# Patient Record
Sex: MALE | Race: WHITE | NOT HISPANIC OR LATINO | Employment: OTHER | URBAN - METROPOLITAN AREA
[De-identification: names, ages, dates, MRNs, and addresses within clinical notes are randomized per-mention and may not be internally consistent; named-entity substitution may affect disease eponyms.]

---

## 2020-10-22 ENCOUNTER — PRE-ADMISSION TESTING (OUTPATIENT)
Dept: ADMISSIONS | Facility: MEDICAL CENTER | Age: 70
End: 2020-10-22
Attending: ORTHOPAEDIC SURGERY
Payer: COMMERCIAL

## 2020-10-22 DIAGNOSIS — Z01.810 PRE-OPERATIVE CARDIOVASCULAR EXAMINATION: ICD-10-CM

## 2020-10-22 DIAGNOSIS — Z01.812 PRE-OPERATIVE LABORATORY EXAMINATION: ICD-10-CM

## 2020-10-22 LAB
ANION GAP SERPL CALC-SCNC: 11 MMOL/L (ref 7–16)
APTT PPP: 30.8 SEC (ref 24.7–36)
BUN SERPL-MCNC: 19 MG/DL (ref 8–22)
CALCIUM SERPL-MCNC: 9.3 MG/DL (ref 8.4–10.2)
CHLORIDE SERPL-SCNC: 107 MMOL/L (ref 96–112)
CO2 SERPL-SCNC: 27 MMOL/L (ref 20–33)
CREAT SERPL-MCNC: 1.29 MG/DL (ref 0.5–1.4)
EKG IMPRESSION: NORMAL
ERYTHROCYTE [DISTWIDTH] IN BLOOD BY AUTOMATED COUNT: 48.6 FL (ref 35.9–50)
EST. AVERAGE GLUCOSE BLD GHB EST-MCNC: 105 MG/DL
GLUCOSE SERPL-MCNC: 102 MG/DL (ref 65–99)
HBA1C MFR BLD: 5.3 % (ref 0–5.6)
HCT VFR BLD AUTO: 47.5 % (ref 42–52)
HGB BLD-MCNC: 16.1 G/DL (ref 14–18)
HIV 1+2 AB+HIV1 P24 AG SERPL QL IA: NORMAL
INR PPP: 0.96 (ref 0.87–1.13)
MCH RBC QN AUTO: 33.3 PG (ref 27–33)
MCHC RBC AUTO-ENTMCNC: 33.9 G/DL (ref 33.7–35.3)
MCV RBC AUTO: 98.1 FL (ref 81.4–97.8)
PLATELET # BLD AUTO: 244 K/UL (ref 164–446)
PMV BLD AUTO: 10.6 FL (ref 9–12.9)
POTASSIUM SERPL-SCNC: 4 MMOL/L (ref 3.6–5.5)
PROTHROMBIN TIME: 12.5 SEC (ref 12–14.6)
RBC # BLD AUTO: 4.84 M/UL (ref 4.7–6.1)
SCCMEC + MECA PNL NOSE NAA+PROBE: NEGATIVE
SCCMEC + MECA PNL NOSE NAA+PROBE: NEGATIVE
SODIUM SERPL-SCNC: 145 MMOL/L (ref 135–145)
WBC # BLD AUTO: 7.7 K/UL (ref 4.8–10.8)

## 2020-10-22 PROCEDURE — 80048 BASIC METABOLIC PNL TOTAL CA: CPT

## 2020-10-22 PROCEDURE — 87389 HIV-1 AG W/HIV-1&-2 AB AG IA: CPT

## 2020-10-22 PROCEDURE — 36415 COLL VENOUS BLD VENIPUNCTURE: CPT

## 2020-10-22 PROCEDURE — 85027 COMPLETE CBC AUTOMATED: CPT

## 2020-10-22 PROCEDURE — 85730 THROMBOPLASTIN TIME PARTIAL: CPT

## 2020-10-22 PROCEDURE — 87640 STAPH A DNA AMP PROBE: CPT

## 2020-10-22 PROCEDURE — 93005 ELECTROCARDIOGRAM TRACING: CPT

## 2020-10-22 PROCEDURE — 83036 HEMOGLOBIN GLYCOSYLATED A1C: CPT

## 2020-10-22 PROCEDURE — 93010 ELECTROCARDIOGRAM REPORT: CPT | Performed by: INTERNAL MEDICINE

## 2020-10-22 PROCEDURE — 87641 MR-STAPH DNA AMP PROBE: CPT

## 2020-10-22 PROCEDURE — 85610 PROTHROMBIN TIME: CPT

## 2020-10-22 RX ORDER — COLCHICINE 0.6 MG/1
0.5 TABLET, FILM COATED ORAL PRN
COMMUNITY
Start: 2020-08-13

## 2020-10-22 RX ORDER — IBUPROFEN 200 MG
200 TABLET ORAL EVERY 6 HOURS PRN
Status: ON HOLD | COMMUNITY
End: 2020-11-04

## 2020-10-22 SDOH — HEALTH STABILITY: MENTAL HEALTH: HOW MANY STANDARD DRINKS CONTAINING ALCOHOL DO YOU HAVE ON A TYPICAL DAY?: 1 OR 2

## 2020-10-22 SDOH — HEALTH STABILITY: MENTAL HEALTH: HOW OFTEN DO YOU HAVE A DRINK CONTAINING ALCOHOL?: 4 OR MORE TIMES A WEEK

## 2020-10-22 NOTE — DISCHARGE PLANNING
DISCHARGE PLANNING NOTE - TOTAL JOINT     Procedure: Procedure(s):  ARTHROPLASTY, KNEE, TOTAL  Procedure Date: 11/4/2020  Insurance:  Payor: AETNA / Plan: AETNA / Product Type: Multiple /   Equipment currently available at home? nothing.  Steps into the home? 2  Steps within the home? 0  Toilet height? Standard  Type of shower? walk-in shower  Who will be with you during your recovery? Spouse.  Is Outpatient Physical Therapy set up after surgery? Yes  Did you take the Total Joint Class and where? Yes, at online last week.   Planning on same day discharge? yes     This writer met with pt during his preadmission appointment. Equipment resource list reviewed and pt states he needs a walker. Choice obtained and scanned in to [email or web address]. Home safety checklist reviewed and copy given to pt. All questions answered and pt verbalizes understanding of all instructions. No further dc needs identified at this time. Anticipate dc to home without barriers.

## 2020-10-22 NOTE — OR NURSING
"Pre-admit appointment completed. \"Preparing for your procedure\" sheet given to pt along with verbal and written instructions. Pt instructed to continue regularly prescribed medications through the day before surgery.     Pt denies any problems with anesthesia.    Pt to get COVID test 10/29/20.           Pt given instructions to self isolate after COVID test and to contact doctor if any symptoms of COVID occur.  "

## 2020-10-29 ENCOUNTER — PRE-ADMISSION TESTING (OUTPATIENT)
Dept: ADMISSIONS | Facility: MEDICAL CENTER | Age: 70
End: 2020-10-29
Attending: ORTHOPAEDIC SURGERY
Payer: COMMERCIAL

## 2020-10-29 DIAGNOSIS — Z01.812 PRE-OPERATIVE LABORATORY EXAMINATION: ICD-10-CM

## 2020-10-29 LAB
COVID ORDER STATUS COVID19: NORMAL
SARS-COV-2 RNA RESP QL NAA+PROBE: NOTDETECTED
SPECIMEN SOURCE: NORMAL

## 2020-10-29 PROCEDURE — C9803 HOPD COVID-19 SPEC COLLECT: HCPCS

## 2020-11-04 ENCOUNTER — APPOINTMENT (OUTPATIENT)
Dept: RADIOLOGY | Facility: MEDICAL CENTER | Age: 70
End: 2020-11-04
Attending: ORTHOPAEDIC SURGERY
Payer: COMMERCIAL

## 2020-11-04 ENCOUNTER — HOSPITAL ENCOUNTER (OUTPATIENT)
Facility: MEDICAL CENTER | Age: 70
End: 2020-11-05
Attending: ORTHOPAEDIC SURGERY | Admitting: ORTHOPAEDIC SURGERY
Payer: COMMERCIAL

## 2020-11-04 ENCOUNTER — ANESTHESIA EVENT (OUTPATIENT)
Dept: SURGERY | Facility: MEDICAL CENTER | Age: 70
End: 2020-11-04
Payer: COMMERCIAL

## 2020-11-04 ENCOUNTER — ANESTHESIA (OUTPATIENT)
Dept: SURGERY | Facility: MEDICAL CENTER | Age: 70
End: 2020-11-04
Payer: COMMERCIAL

## 2020-11-04 DIAGNOSIS — M19.90 ARTHRITIS: ICD-10-CM

## 2020-11-04 DIAGNOSIS — M25.562 BILATERAL CHRONIC KNEE PAIN: ICD-10-CM

## 2020-11-04 DIAGNOSIS — M17.11 PRIMARY OSTEOARTHRITIS OF RIGHT KNEE: ICD-10-CM

## 2020-11-04 DIAGNOSIS — M25.561 BILATERAL CHRONIC KNEE PAIN: ICD-10-CM

## 2020-11-04 DIAGNOSIS — G89.29 BILATERAL CHRONIC KNEE PAIN: ICD-10-CM

## 2020-11-04 PROBLEM — M10.9 GOUT: Status: ACTIVE | Noted: 2020-11-04

## 2020-11-04 PROBLEM — R12 HEART BURN: Status: ACTIVE | Noted: 2020-11-04

## 2020-11-04 LAB — PATHOLOGY CONSULT NOTE: NORMAL

## 2020-11-04 PROCEDURE — 503339 HCHG DRESSSING PICO: Performed by: ORTHOPAEDIC SURGERY

## 2020-11-04 PROCEDURE — G0378 HOSPITAL OBSERVATION PER HR: HCPCS

## 2020-11-04 PROCEDURE — 88161 CYTOPATH SMEAR OTHER SOURCE: CPT

## 2020-11-04 PROCEDURE — 96375 TX/PRO/DX INJ NEW DRUG ADDON: CPT

## 2020-11-04 PROCEDURE — 700101 HCHG RX REV CODE 250: Performed by: ORTHOPAEDIC SURGERY

## 2020-11-04 PROCEDURE — 302135 SEQUENTIAL COMPRESSION MACHINE: Performed by: ORTHOPAEDIC SURGERY

## 2020-11-04 PROCEDURE — 700102 HCHG RX REV CODE 250 W/ 637 OVERRIDE(OP): Performed by: ANESTHESIOLOGY

## 2020-11-04 PROCEDURE — 700105 HCHG RX REV CODE 258: Performed by: ANESTHESIOLOGY

## 2020-11-04 PROCEDURE — 160048 HCHG OR STATISTICAL LEVEL 1-5: Performed by: ORTHOPAEDIC SURGERY

## 2020-11-04 PROCEDURE — 700101 HCHG RX REV CODE 250: Performed by: ANESTHESIOLOGY

## 2020-11-04 PROCEDURE — 64447 NJX AA&/STRD FEMORAL NRV IMG: CPT | Performed by: ORTHOPAEDIC SURGERY

## 2020-11-04 PROCEDURE — 160009 HCHG ANES TIME/MIN: Performed by: ORTHOPAEDIC SURGERY

## 2020-11-04 PROCEDURE — 700111 HCHG RX REV CODE 636 W/ 250 OVERRIDE (IP): Performed by: ANESTHESIOLOGY

## 2020-11-04 PROCEDURE — 501838 HCHG SUTURE GENERAL: Performed by: ORTHOPAEDIC SURGERY

## 2020-11-04 PROCEDURE — A9270 NON-COVERED ITEM OR SERVICE: HCPCS | Performed by: ORTHOPAEDIC SURGERY

## 2020-11-04 PROCEDURE — 96365 THER/PROPH/DIAG IV INF INIT: CPT

## 2020-11-04 PROCEDURE — C1776 JOINT DEVICE (IMPLANTABLE): HCPCS | Performed by: ORTHOPAEDIC SURGERY

## 2020-11-04 PROCEDURE — 502579 HCHG PACK, TOTAL KNEE: Performed by: ORTHOPAEDIC SURGERY

## 2020-11-04 PROCEDURE — 160041 HCHG SURGERY MINUTES - EA ADDL 1 MIN LEVEL 4: Performed by: ORTHOPAEDIC SURGERY

## 2020-11-04 PROCEDURE — 160035 HCHG PACU - 1ST 60 MINS PHASE I: Performed by: ORTHOPAEDIC SURGERY

## 2020-11-04 PROCEDURE — 700105 HCHG RX REV CODE 258: Performed by: PHYSICIAN ASSISTANT

## 2020-11-04 PROCEDURE — 160036 HCHG PACU - EA ADDL 30 MINS PHASE I: Performed by: ORTHOPAEDIC SURGERY

## 2020-11-04 PROCEDURE — 88304 TISSUE EXAM BY PATHOLOGIST: CPT

## 2020-11-04 PROCEDURE — 700101 HCHG RX REV CODE 250: Performed by: PHYSICIAN ASSISTANT

## 2020-11-04 PROCEDURE — 73560 X-RAY EXAM OF KNEE 1 OR 2: CPT | Mod: RT

## 2020-11-04 PROCEDURE — A9270 NON-COVERED ITEM OR SERVICE: HCPCS | Performed by: PHYSICIAN ASSISTANT

## 2020-11-04 PROCEDURE — L8699 PROSTHETIC IMPLANT NOS: HCPCS | Performed by: ORTHOPAEDIC SURGERY

## 2020-11-04 PROCEDURE — 700102 HCHG RX REV CODE 250 W/ 637 OVERRIDE(OP): Performed by: ORTHOPAEDIC SURGERY

## 2020-11-04 PROCEDURE — 88160 CYTOPATH SMEAR OTHER SOURCE: CPT

## 2020-11-04 PROCEDURE — 160029 HCHG SURGERY MINUTES - 1ST 30 MINS LEVEL 4: Performed by: ORTHOPAEDIC SURGERY

## 2020-11-04 PROCEDURE — 700111 HCHG RX REV CODE 636 W/ 250 OVERRIDE (IP): Performed by: ORTHOPAEDIC SURGERY

## 2020-11-04 PROCEDURE — 700111 HCHG RX REV CODE 636 W/ 250 OVERRIDE (IP): Performed by: PHYSICIAN ASSISTANT

## 2020-11-04 PROCEDURE — 502240 HCHG MISC OR SUPPLY RC 0272: Performed by: ORTHOPAEDIC SURGERY

## 2020-11-04 PROCEDURE — 700102 HCHG RX REV CODE 250 W/ 637 OVERRIDE(OP): Performed by: PHYSICIAN ASSISTANT

## 2020-11-04 PROCEDURE — A9270 NON-COVERED ITEM OR SERVICE: HCPCS | Performed by: ANESTHESIOLOGY

## 2020-11-04 PROCEDURE — 160002 HCHG RECOVERY MINUTES (STAT): Performed by: ORTHOPAEDIC SURGERY

## 2020-11-04 PROCEDURE — 700105 HCHG RX REV CODE 258: Performed by: ORTHOPAEDIC SURGERY

## 2020-11-04 DEVICE — INSERT BCS XLPE ARIGHT JOUNEY II  SIZE 5-6 RIGHT 9MM: Type: IMPLANTABLE DEVICE | Site: KNEE | Status: FUNCTIONAL

## 2020-11-04 DEVICE — FEMUR BCS OXINIUM JOURNEY II RIGHT SIZE 6 (1EA): Type: IMPLANTABLE DEVICE | Site: KNEE | Status: FUNCTIONAL

## 2020-11-04 DEVICE — BASE TIBIAL NONPOROUS JOURNEY II RIGHT SIZE 5 (1EA): Type: IMPLANTABLE DEVICE | Site: KNEE | Status: FUNCTIONAL

## 2020-11-04 DEVICE — IMPLANT GII OVAL RESURFACING PAT 32MM (1EA): Type: IMPLANTABLE DEVICE | Site: KNEE | Status: FUNCTIONAL

## 2020-11-04 DEVICE — CEMENT ORTHOPEDIC HV US  (10/PK): Type: IMPLANTABLE DEVICE | Site: KNEE | Status: FUNCTIONAL

## 2020-11-04 RX ORDER — OXYCODONE HYDROCHLORIDE 5 MG/1
5 TABLET ORAL
Status: DISCONTINUED | OUTPATIENT
Start: 2020-11-04 | End: 2020-11-05 | Stop reason: HOSPADM

## 2020-11-04 RX ORDER — HYDROMORPHONE HYDROCHLORIDE 1 MG/ML
0.5 INJECTION, SOLUTION INTRAMUSCULAR; INTRAVENOUS; SUBCUTANEOUS
Status: DISCONTINUED | OUTPATIENT
Start: 2020-11-04 | End: 2020-11-05 | Stop reason: HOSPADM

## 2020-11-04 RX ORDER — AMOXICILLIN 250 MG
1 CAPSULE ORAL
Status: DISCONTINUED | OUTPATIENT
Start: 2020-11-04 | End: 2020-11-05 | Stop reason: HOSPADM

## 2020-11-04 RX ORDER — OXYCODONE HYDROCHLORIDE 10 MG/1
10 TABLET ORAL
Status: DISCONTINUED | OUTPATIENT
Start: 2020-11-04 | End: 2020-11-05 | Stop reason: HOSPADM

## 2020-11-04 RX ORDER — KETOROLAC TROMETHAMINE 30 MG/ML
15 INJECTION, SOLUTION INTRAMUSCULAR; INTRAVENOUS EVERY 6 HOURS
Status: DISCONTINUED | OUTPATIENT
Start: 2020-11-04 | End: 2020-11-05 | Stop reason: HOSPADM

## 2020-11-04 RX ORDER — ONDANSETRON 2 MG/ML
4 INJECTION INTRAMUSCULAR; INTRAVENOUS
Status: DISCONTINUED | OUTPATIENT
Start: 2020-11-04 | End: 2020-11-04 | Stop reason: HOSPADM

## 2020-11-04 RX ORDER — LABETALOL HYDROCHLORIDE 5 MG/ML
5 INJECTION, SOLUTION INTRAVENOUS
Status: DISCONTINUED | OUTPATIENT
Start: 2020-11-04 | End: 2020-11-04 | Stop reason: HOSPADM

## 2020-11-04 RX ORDER — ACETAMINOPHEN 500 MG
500 TABLET ORAL EVERY 6 HOURS
Status: DISCONTINUED | OUTPATIENT
Start: 2020-11-04 | End: 2020-11-05 | Stop reason: HOSPADM

## 2020-11-04 RX ORDER — CELECOXIB 200 MG/1
200 CAPSULE ORAL 2 TIMES DAILY
Status: DISCONTINUED | OUTPATIENT
Start: 2020-11-05 | End: 2020-11-05 | Stop reason: HOSPADM

## 2020-11-04 RX ORDER — ROPIVACAINE HYDROCHLORIDE 5 MG/ML
INJECTION, SOLUTION EPIDURAL; INFILTRATION; PERINEURAL
Status: DISCONTINUED | OUTPATIENT
Start: 2020-11-04 | End: 2020-11-04 | Stop reason: HOSPADM

## 2020-11-04 RX ORDER — OXYCODONE HCL 5 MG/5 ML
5 SOLUTION, ORAL ORAL
Status: COMPLETED | OUTPATIENT
Start: 2020-11-04 | End: 2020-11-04

## 2020-11-04 RX ORDER — GABAPENTIN 300 MG/1
300 CAPSULE ORAL ONCE
Status: COMPLETED | OUTPATIENT
Start: 2020-11-04 | End: 2020-11-04

## 2020-11-04 RX ORDER — TRANEXAMIC ACID 100 MG/ML
INJECTION, SOLUTION INTRAVENOUS PRN
Status: DISCONTINUED | OUTPATIENT
Start: 2020-11-04 | End: 2020-11-04 | Stop reason: SURG

## 2020-11-04 RX ORDER — HALOPERIDOL 5 MG/ML
1 INJECTION INTRAMUSCULAR EVERY 6 HOURS PRN
Status: DISCONTINUED | OUTPATIENT
Start: 2020-11-04 | End: 2020-11-05 | Stop reason: HOSPADM

## 2020-11-04 RX ORDER — POLYETHYLENE GLYCOL 3350 17 G/17G
1 POWDER, FOR SOLUTION ORAL 2 TIMES DAILY PRN
Status: DISCONTINUED | OUTPATIENT
Start: 2020-11-04 | End: 2020-11-05 | Stop reason: HOSPADM

## 2020-11-04 RX ORDER — TRAMADOL HYDROCHLORIDE 50 MG/1
50 TABLET ORAL EVERY 4 HOURS PRN
Status: DISCONTINUED | OUTPATIENT
Start: 2020-11-04 | End: 2020-11-05 | Stop reason: HOSPADM

## 2020-11-04 RX ORDER — HYDROMORPHONE HYDROCHLORIDE 1 MG/ML
0.4 INJECTION, SOLUTION INTRAMUSCULAR; INTRAVENOUS; SUBCUTANEOUS
Status: DISCONTINUED | OUTPATIENT
Start: 2020-11-04 | End: 2020-11-04 | Stop reason: HOSPADM

## 2020-11-04 RX ORDER — METOPROLOL TARTRATE 1 MG/ML
1 INJECTION, SOLUTION INTRAVENOUS
Status: DISCONTINUED | OUTPATIENT
Start: 2020-11-04 | End: 2020-11-04 | Stop reason: HOSPADM

## 2020-11-04 RX ORDER — CELECOXIB 200 MG/1
200 CAPSULE ORAL ONCE
Status: COMPLETED | OUTPATIENT
Start: 2020-11-04 | End: 2020-11-04

## 2020-11-04 RX ORDER — DEXAMETHASONE SODIUM PHOSPHATE 4 MG/ML
8 INJECTION, SOLUTION INTRA-ARTICULAR; INTRALESIONAL; INTRAMUSCULAR; INTRAVENOUS; SOFT TISSUE ONCE
Status: COMPLETED | OUTPATIENT
Start: 2020-11-05 | End: 2020-11-05

## 2020-11-04 RX ORDER — AMOXICILLIN 250 MG
1 CAPSULE ORAL NIGHTLY
Status: DISCONTINUED | OUTPATIENT
Start: 2020-11-04 | End: 2020-11-05 | Stop reason: HOSPADM

## 2020-11-04 RX ORDER — ONDANSETRON 4 MG/1
4 TABLET, ORALLY DISINTEGRATING ORAL EVERY 4 HOURS PRN
Status: DISCONTINUED | OUTPATIENT
Start: 2020-11-04 | End: 2020-11-05 | Stop reason: HOSPADM

## 2020-11-04 RX ORDER — BISACODYL 10 MG
10 SUPPOSITORY, RECTAL RECTAL
Status: DISCONTINUED | OUTPATIENT
Start: 2020-11-04 | End: 2020-11-05 | Stop reason: HOSPADM

## 2020-11-04 RX ORDER — SCOLOPAMINE TRANSDERMAL SYSTEM 1 MG/1
1 PATCH, EXTENDED RELEASE TRANSDERMAL
Status: DISCONTINUED | OUTPATIENT
Start: 2020-11-04 | End: 2020-11-05 | Stop reason: HOSPADM

## 2020-11-04 RX ORDER — CHLORPROMAZINE HYDROCHLORIDE 25 MG/1
25 TABLET, FILM COATED ORAL EVERY 6 HOURS PRN
Status: DISCONTINUED | OUTPATIENT
Start: 2020-11-04 | End: 2020-11-05 | Stop reason: HOSPADM

## 2020-11-04 RX ORDER — SODIUM CHLORIDE 9 MG/ML
INJECTION, SOLUTION INTRAMUSCULAR; INTRAVENOUS; SUBCUTANEOUS
Status: DISCONTINUED | OUTPATIENT
Start: 2020-11-04 | End: 2020-11-04 | Stop reason: HOSPADM

## 2020-11-04 RX ORDER — SODIUM CHLORIDE, SODIUM LACTATE, POTASSIUM CHLORIDE, CALCIUM CHLORIDE 600; 310; 30; 20 MG/100ML; MG/100ML; MG/100ML; MG/100ML
INJECTION, SOLUTION INTRAVENOUS CONTINUOUS
Status: ACTIVE | OUTPATIENT
Start: 2020-11-04 | End: 2020-11-04

## 2020-11-04 RX ORDER — LIDOCAINE HYDROCHLORIDE 20 MG/ML
INJECTION, SOLUTION EPIDURAL; INFILTRATION; INTRACAUDAL; PERINEURAL PRN
Status: DISCONTINUED | OUTPATIENT
Start: 2020-11-04 | End: 2020-11-04 | Stop reason: SURG

## 2020-11-04 RX ORDER — ONDANSETRON 2 MG/ML
4 INJECTION INTRAMUSCULAR; INTRAVENOUS EVERY 4 HOURS PRN
Status: DISCONTINUED | OUTPATIENT
Start: 2020-11-04 | End: 2020-11-05 | Stop reason: HOSPADM

## 2020-11-04 RX ORDER — HYDROMORPHONE HYDROCHLORIDE 2 MG/ML
INJECTION, SOLUTION INTRAMUSCULAR; INTRAVENOUS; SUBCUTANEOUS PRN
Status: DISCONTINUED | OUTPATIENT
Start: 2020-11-04 | End: 2020-11-04 | Stop reason: SURG

## 2020-11-04 RX ORDER — BUPIVACAINE HYDROCHLORIDE 2.5 MG/ML
INJECTION, SOLUTION EPIDURAL; INFILTRATION; INTRACAUDAL
Status: COMPLETED | OUTPATIENT
Start: 2020-11-04 | End: 2020-11-04

## 2020-11-04 RX ORDER — ONDANSETRON 4 MG/1
4 TABLET, ORALLY DISINTEGRATING ORAL EVERY 4 HOURS PRN
Qty: 10 TAB | Refills: 0 | Status: SHIPPED | OUTPATIENT
Start: 2020-11-04

## 2020-11-04 RX ORDER — HYDROMORPHONE HYDROCHLORIDE 1 MG/ML
0.1 INJECTION, SOLUTION INTRAMUSCULAR; INTRAVENOUS; SUBCUTANEOUS
Status: DISCONTINUED | OUTPATIENT
Start: 2020-11-04 | End: 2020-11-04 | Stop reason: HOSPADM

## 2020-11-04 RX ORDER — OXYCODONE HCL 5 MG/5 ML
10 SOLUTION, ORAL ORAL
Status: COMPLETED | OUTPATIENT
Start: 2020-11-04 | End: 2020-11-04

## 2020-11-04 RX ORDER — HALOPERIDOL 5 MG/ML
0.5 INJECTION INTRAMUSCULAR
Status: DISCONTINUED | OUTPATIENT
Start: 2020-11-04 | End: 2020-11-04 | Stop reason: HOSPADM

## 2020-11-04 RX ORDER — HYDRALAZINE HYDROCHLORIDE 20 MG/ML
5 INJECTION INTRAMUSCULAR; INTRAVENOUS
Status: DISCONTINUED | OUTPATIENT
Start: 2020-11-04 | End: 2020-11-04 | Stop reason: HOSPADM

## 2020-11-04 RX ORDER — TRAMADOL HYDROCHLORIDE 50 MG/1
50 TABLET ORAL EVERY 6 HOURS PRN
Qty: 40 TAB | Refills: 0
Start: 2020-11-04 | End: 2020-11-14

## 2020-11-04 RX ORDER — ENEMA 19; 7 G/133ML; G/133ML
1 ENEMA RECTAL
Status: DISCONTINUED | OUTPATIENT
Start: 2020-11-04 | End: 2020-11-05 | Stop reason: HOSPADM

## 2020-11-04 RX ORDER — ACETAMINOPHEN 500 MG
500 TABLET ORAL EVERY 6 HOURS
Qty: 30 TAB | Refills: 0 | Status: SHIPPED | OUTPATIENT
Start: 2020-11-04

## 2020-11-04 RX ORDER — CEFAZOLIN SODIUM 1 G/3ML
INJECTION, POWDER, FOR SOLUTION INTRAMUSCULAR; INTRAVENOUS PRN
Status: DISCONTINUED | OUTPATIENT
Start: 2020-11-04 | End: 2020-11-04 | Stop reason: SURG

## 2020-11-04 RX ORDER — SODIUM CHLORIDE, SODIUM GLUCONATE, SODIUM ACETATE, POTASSIUM CHLORIDE AND MAGNESIUM CHLORIDE 526; 502; 368; 37; 30 MG/100ML; MG/100ML; MG/100ML; MG/100ML; MG/100ML
INJECTION, SOLUTION INTRAVENOUS CONTINUOUS
Status: DISCONTINUED | OUTPATIENT
Start: 2020-11-04 | End: 2020-11-04 | Stop reason: HOSPADM

## 2020-11-04 RX ORDER — DEXTROSE MONOHYDRATE, SODIUM CHLORIDE, AND POTASSIUM CHLORIDE 50; 1.49; 4.5 G/1000ML; G/1000ML; G/1000ML
INJECTION, SOLUTION INTRAVENOUS CONTINUOUS
Status: DISPENSED | OUTPATIENT
Start: 2020-11-04 | End: 2020-11-04

## 2020-11-04 RX ORDER — CHLORPROMAZINE HYDROCHLORIDE 25 MG/ML
25 INJECTION INTRAMUSCULAR EVERY 6 HOURS PRN
Status: DISCONTINUED | OUTPATIENT
Start: 2020-11-04 | End: 2020-11-05 | Stop reason: HOSPADM

## 2020-11-04 RX ORDER — DIPHENHYDRAMINE HYDROCHLORIDE 50 MG/ML
25 INJECTION INTRAMUSCULAR; INTRAVENOUS EVERY 6 HOURS PRN
Status: DISCONTINUED | OUTPATIENT
Start: 2020-11-04 | End: 2020-11-05 | Stop reason: HOSPADM

## 2020-11-04 RX ORDER — EPINEPHRINE 1 MG/ML(1)
AMPUL (ML) INJECTION
Status: DISCONTINUED | OUTPATIENT
Start: 2020-11-04 | End: 2020-11-04 | Stop reason: HOSPADM

## 2020-11-04 RX ORDER — ACETAMINOPHEN 500 MG
1000 TABLET ORAL ONCE
Status: COMPLETED | OUTPATIENT
Start: 2020-11-04 | End: 2020-11-04

## 2020-11-04 RX ORDER — OXYCODONE HYDROCHLORIDE 5 MG/1
5 TABLET ORAL EVERY 6 HOURS PRN
Qty: 40 TAB | Refills: 0 | Status: ON HOLD
Start: 2020-11-04 | End: 2020-11-12

## 2020-11-04 RX ORDER — ONDANSETRON 2 MG/ML
INJECTION INTRAMUSCULAR; INTRAVENOUS PRN
Status: DISCONTINUED | OUTPATIENT
Start: 2020-11-04 | End: 2020-11-04 | Stop reason: SURG

## 2020-11-04 RX ORDER — ASPIRIN 81 MG/1
81 TABLET ORAL 2 TIMES DAILY
Qty: 30 TAB | Refills: 0
Start: 2020-11-04

## 2020-11-04 RX ORDER — TAMSULOSIN HYDROCHLORIDE 0.4 MG/1
0.4 CAPSULE ORAL
Status: DISCONTINUED | OUTPATIENT
Start: 2020-11-04 | End: 2020-11-05 | Stop reason: HOSPADM

## 2020-11-04 RX ORDER — DEXAMETHASONE SODIUM PHOSPHATE 4 MG/ML
4 INJECTION, SOLUTION INTRA-ARTICULAR; INTRALESIONAL; INTRAMUSCULAR; INTRAVENOUS; SOFT TISSUE
Status: COMPLETED | OUTPATIENT
Start: 2020-11-04 | End: 2020-11-04

## 2020-11-04 RX ORDER — DEXAMETHASONE SODIUM PHOSPHATE 4 MG/ML
INJECTION, SOLUTION INTRA-ARTICULAR; INTRALESIONAL; INTRAMUSCULAR; INTRAVENOUS; SOFT TISSUE PRN
Status: DISCONTINUED | OUTPATIENT
Start: 2020-11-04 | End: 2020-11-04 | Stop reason: SURG

## 2020-11-04 RX ORDER — DIPHENHYDRAMINE HCL 25 MG
25 TABLET ORAL EVERY 6 HOURS PRN
Status: DISCONTINUED | OUTPATIENT
Start: 2020-11-04 | End: 2020-11-05 | Stop reason: HOSPADM

## 2020-11-04 RX ORDER — KETOROLAC TROMETHAMINE 30 MG/ML
INJECTION, SOLUTION INTRAMUSCULAR; INTRAVENOUS
Status: DISCONTINUED | OUTPATIENT
Start: 2020-11-04 | End: 2020-11-04 | Stop reason: HOSPADM

## 2020-11-04 RX ORDER — DOCUSATE SODIUM 100 MG/1
100 CAPSULE, LIQUID FILLED ORAL 2 TIMES DAILY
Status: DISCONTINUED | OUTPATIENT
Start: 2020-11-04 | End: 2020-11-05 | Stop reason: HOSPADM

## 2020-11-04 RX ORDER — SODIUM CHLORIDE, SODIUM GLUCONATE, SODIUM ACETATE, POTASSIUM CHLORIDE AND MAGNESIUM CHLORIDE 526; 502; 368; 37; 30 MG/100ML; MG/100ML; MG/100ML; MG/100ML; MG/100ML
INJECTION, SOLUTION INTRAVENOUS
Status: DISCONTINUED | OUTPATIENT
Start: 2020-11-04 | End: 2020-11-04 | Stop reason: SURG

## 2020-11-04 RX ORDER — HYDROMORPHONE HYDROCHLORIDE 1 MG/ML
0.2 INJECTION, SOLUTION INTRAMUSCULAR; INTRAVENOUS; SUBCUTANEOUS
Status: DISCONTINUED | OUTPATIENT
Start: 2020-11-04 | End: 2020-11-04 | Stop reason: HOSPADM

## 2020-11-04 RX ORDER — MIDAZOLAM HYDROCHLORIDE 1 MG/ML
INJECTION INTRAMUSCULAR; INTRAVENOUS PRN
Status: DISCONTINUED | OUTPATIENT
Start: 2020-11-04 | End: 2020-11-04 | Stop reason: SURG

## 2020-11-04 RX ORDER — DIPHENHYDRAMINE HCL 25 MG
25 TABLET ORAL NIGHTLY PRN
Status: DISCONTINUED | OUTPATIENT
Start: 2020-11-05 | End: 2020-11-05 | Stop reason: HOSPADM

## 2020-11-04 RX ADMIN — DIPHENHYDRAMINE HYDROCHLORIDE 25 MG: 50 INJECTION INTRAMUSCULAR; INTRAVENOUS at 15:51

## 2020-11-04 RX ADMIN — PROPOFOL 140 MG: 10 INJECTION, EMULSION INTRAVENOUS at 08:52

## 2020-11-04 RX ADMIN — HYDROMORPHONE HYDROCHLORIDE 0.4 MG: 1 INJECTION, SOLUTION INTRAMUSCULAR; INTRAVENOUS; SUBCUTANEOUS at 11:18

## 2020-11-04 RX ADMIN — FENTANYL CITRATE 50 MCG: 50 INJECTION, SOLUTION INTRAMUSCULAR; INTRAVENOUS at 09:07

## 2020-11-04 RX ADMIN — LIDOCAINE HYDROCHLORIDE 0.5 ML: 10 INJECTION, SOLUTION INFILTRATION; PERINEURAL at 07:20

## 2020-11-04 RX ADMIN — SENNOSIDES-DOCUSATE SODIUM TAB 8.6-50 MG 1 TABLET: 8.6-5 TAB at 22:09

## 2020-11-04 RX ADMIN — SODIUM CHLORIDE 2 G: 9 INJECTION, SOLUTION INTRAVENOUS at 17:42

## 2020-11-04 RX ADMIN — FENTANYL CITRATE 50 MCG: 50 INJECTION, SOLUTION INTRAMUSCULAR; INTRAVENOUS at 08:39

## 2020-11-04 RX ADMIN — HYDROMORPHONE HYDROCHLORIDE 0.4 MG: 1 INJECTION, SOLUTION INTRAMUSCULAR; INTRAVENOUS; SUBCUTANEOUS at 11:05

## 2020-11-04 RX ADMIN — MIDAZOLAM HYDROCHLORIDE 1 MG: 1 INJECTION, SOLUTION INTRAMUSCULAR; INTRAVENOUS at 08:39

## 2020-11-04 RX ADMIN — TRAMADOL HYDROCHLORIDE 50 MG: 50 TABLET, FILM COATED ORAL at 22:10

## 2020-11-04 RX ADMIN — TRANEXAMIC ACID 700 MG: 100 INJECTION, SOLUTION INTRAVENOUS at 08:57

## 2020-11-04 RX ADMIN — CELECOXIB 200 MG: 200 CAPSULE ORAL at 07:20

## 2020-11-04 RX ADMIN — HYDROMORPHONE HYDROCHLORIDE 0.5 MG: 2 INJECTION, SOLUTION INTRAMUSCULAR; INTRAVENOUS; SUBCUTANEOUS at 09:16

## 2020-11-04 RX ADMIN — DEXAMETHASONE SODIUM PHOSPHATE 4 MG: 4 INJECTION, SOLUTION INTRA-ARTICULAR; INTRALESIONAL; INTRAMUSCULAR; INTRAVENOUS; SOFT TISSUE at 14:16

## 2020-11-04 RX ADMIN — DEXAMETHASONE SODIUM PHOSPHATE 8 MG: 4 INJECTION, SOLUTION INTRAMUSCULAR; INTRAVENOUS at 08:52

## 2020-11-04 RX ADMIN — HYDROMORPHONE HYDROCHLORIDE 0.4 MG: 1 INJECTION, SOLUTION INTRAMUSCULAR; INTRAVENOUS; SUBCUTANEOUS at 10:56

## 2020-11-04 RX ADMIN — GABAPENTIN 300 MG: 300 CAPSULE ORAL at 07:20

## 2020-11-04 RX ADMIN — ONDANSETRON 4 MG: 2 INJECTION INTRAMUSCULAR; INTRAVENOUS at 13:08

## 2020-11-04 RX ADMIN — HYDROMORPHONE HYDROCHLORIDE 0.4 MG: 1 INJECTION, SOLUTION INTRAMUSCULAR; INTRAVENOUS; SUBCUTANEOUS at 11:57

## 2020-11-04 RX ADMIN — ONDANSETRON 4 MG: 2 INJECTION INTRAMUSCULAR; INTRAVENOUS at 09:45

## 2020-11-04 RX ADMIN — FENTANYL CITRATE 50 MCG: 50 INJECTION, SOLUTION INTRAMUSCULAR; INTRAVENOUS at 09:14

## 2020-11-04 RX ADMIN — FENTANYL CITRATE 50 MCG: 50 INJECTION, SOLUTION INTRAMUSCULAR; INTRAVENOUS at 10:19

## 2020-11-04 RX ADMIN — OXYCODONE HYDROCHLORIDE 10 MG: 5 SOLUTION ORAL at 10:18

## 2020-11-04 RX ADMIN — LIDOCAINE HYDROCHLORIDE 100 MG: 20 INJECTION, SOLUTION EPIDURAL; INFILTRATION; INTRACAUDAL; PERINEURAL at 08:51

## 2020-11-04 RX ADMIN — SODIUM CHLORIDE, SODIUM GLUCONATE, SODIUM ACETATE, POTASSIUM CHLORIDE AND MAGNESIUM CHLORIDE: 526; 502; 368; 37; 30 INJECTION, SOLUTION INTRAVENOUS at 09:34

## 2020-11-04 RX ADMIN — FENTANYL CITRATE 50 MCG: 50 INJECTION, SOLUTION INTRAMUSCULAR; INTRAVENOUS at 08:50

## 2020-11-04 RX ADMIN — ACETAMINOPHEN 1000 MG: 500 TABLET, FILM COATED ORAL at 07:20

## 2020-11-04 RX ADMIN — OXYCODONE HYDROCHLORIDE 10 MG: 10 TABLET ORAL at 22:10

## 2020-11-04 RX ADMIN — HYDROMORPHONE HYDROCHLORIDE 0.5 MG: 2 INJECTION, SOLUTION INTRAMUSCULAR; INTRAVENOUS; SUBCUTANEOUS at 09:19

## 2020-11-04 RX ADMIN — KETOROLAC TROMETHAMINE 15 MG: 30 INJECTION, SOLUTION INTRAMUSCULAR at 13:20

## 2020-11-04 RX ADMIN — HYDROMORPHONE HYDROCHLORIDE 0.4 MG: 1 INJECTION, SOLUTION INTRAMUSCULAR; INTRAVENOUS; SUBCUTANEOUS at 11:38

## 2020-11-04 RX ADMIN — BUPIVACAINE HYDROCHLORIDE 20 ML: 2.5 INJECTION, SOLUTION EPIDURAL; INFILTRATION; INTRACAUDAL; PERINEURAL at 08:37

## 2020-11-04 RX ADMIN — ASPIRIN 81 MG: 81 TABLET, COATED ORAL at 18:00

## 2020-11-04 RX ADMIN — DOCUSATE SODIUM 100 MG: 100 CAPSULE, LIQUID FILLED ORAL at 17:51

## 2020-11-04 RX ADMIN — POTASSIUM CHLORIDE, DEXTROSE MONOHYDRATE AND SODIUM CHLORIDE: 150; 5; 450 INJECTION, SOLUTION INTRAVENOUS at 13:09

## 2020-11-04 RX ADMIN — FENTANYL CITRATE 50 MCG: 50 INJECTION, SOLUTION INTRAMUSCULAR; INTRAVENOUS at 09:09

## 2020-11-04 RX ADMIN — SODIUM CHLORIDE, POTASSIUM CHLORIDE, SODIUM LACTATE AND CALCIUM CHLORIDE: 600; 310; 30; 20 INJECTION, SOLUTION INTRAVENOUS at 07:20

## 2020-11-04 RX ADMIN — FENTANYL CITRATE 50 MCG: 50 INJECTION, SOLUTION INTRAMUSCULAR; INTRAVENOUS at 10:30

## 2020-11-04 RX ADMIN — TRANEXAMIC ACID 1000 MG: 100 INJECTION, SOLUTION INTRAVENOUS at 10:58

## 2020-11-04 RX ADMIN — ACETAMINOPHEN 500 MG: 500 TABLET, FILM COATED ORAL at 17:51

## 2020-11-04 RX ADMIN — CEFAZOLIN 2 G: 1 INJECTION, POWDER, FOR SOLUTION INTRAVENOUS at 08:56

## 2020-11-04 RX ADMIN — WATER 15 ML: 100 IRRIGANT IRRIGATION at 07:21

## 2020-11-04 RX ADMIN — HYDROMORPHONE HYDROCHLORIDE 0.4 MG: 1 INJECTION, SOLUTION INTRAMUSCULAR; INTRAVENOUS; SUBCUTANEOUS at 10:51

## 2020-11-04 RX ADMIN — HYDROMORPHONE HYDROCHLORIDE 0.5 MG: 2 INJECTION, SOLUTION INTRAMUSCULAR; INTRAVENOUS; SUBCUTANEOUS at 10:10

## 2020-11-04 ASSESSMENT — PAIN DESCRIPTION - PAIN TYPE
TYPE: SURGICAL PAIN
TYPE: CHRONIC PAIN
TYPE: SURGICAL PAIN

## 2020-11-04 ASSESSMENT — PAIN SCALES - GENERAL: PAIN_LEVEL: 4

## 2020-11-04 NOTE — PROGRESS NOTES
Arrived to unit via bed.  Awake and alert.  Dangled and attempted to stand but became very nauseous and dizzy.  Returned to bed and med administered.

## 2020-11-04 NOTE — ANESTHESIA PROCEDURE NOTES
Peripheral Block    Date/Time: 11/4/2020 8:37 AM  Performed by: Randall Bazan M.D.  Authorized by: Randall Bazan M.D.     Start Time:  11/4/2020 8:37 AM  End Time:  11/4/2020 8:42 AM  Reason for Block: at surgeon's request and post-op pain management    patient identified, IV checked, site marked, risks and benefits discussed, surgical consent, monitors and equipment checked, pre-op evaluation and timeout performed    Patient Position:  Supine  Prep: ChloraPrep    Monitoring:  Heart rate and continuous pulse ox  Block Region:  Lower Extremity  Lower Extremity - Block Type:  Selective FEMORAL nerve block at the Adductor Canal    Laterality:  Right  Procedures: ultrasound guided  Image captured, interpreted and electronically stored.  Local Infiltration:  Lidocaine  Strength:  2 %  Dose:  2.5 ml  Block Type:  Single-shot  Needle Length:  100mm  Needle Gauge:  21 G  Needle Localization:  Ultrasound guidance  Injection Assessment:  Negative aspiration for heme, no paresthesia on injection, incremental injection and local visualized surrounding nerve on ultrasound   US Guided Selective Femoral Nerve Block at Adductor Canal, Right:   US probe placed at mid-thigh level on externally rotated leg and femur identified.  Probe directed medially until Sartorius Muscle (SM), Femoral Artery (FA) and Saphenous Nerve (SN) identified in Adductor Canal (AC).  Needle inserted anterolateral to probe in an in plane approach into a subsartorial perivascular perineural position.  After negative aspiration LA injected with ease and visualized spreading within the AC.    150mcg epi added to vial of bupivicaine; 20mL of this solution injected as described.    Copy of US image placed in patients paper chart.

## 2020-11-04 NOTE — ANESTHESIA PREPROCEDURE EVALUATION
For R knee TKA    Relevant Problems   Other   (+) Arthritis   (+) Gout   (+) Heart burn       Physical Exam    Airway   Mallampati: II  TM distance: >3 FB  Neck ROM: full       Cardiovascular - normal exam  Rhythm: regular  Rate: normal  (-) murmur  Comments: By palpation of radial artery   Dental - normal exam           Pulmonary   Comments: No concerns   Abdominal    Neurological     Comments: A&Ox4, grossly intact             Anesthesia Plan    ASA 2       Plan - general and peripheral nerve block     Peripheral nerve block will be post-op pain control  Airway plan will be LMA  (Selective femoral nerve block for POPM per surgeon request)    Plan Factors:   Patient smoked on day of surgery: non smoker.      Induction: intravenous    Postoperative Plan: Postoperative administration of opioids is intended.    Pertinent diagnostic labs and testing reviewed    Informed Consent:    Anesthetic plan and risks discussed with patient.    Use of blood products discussed with: patient whom consented to blood products.

## 2020-11-04 NOTE — DISCHARGE SUMMARY
Mychal Cano was admitted on 11/4/2020 for UNILATERAL PRIMARY OSTEOARTHRITIS RIGHT KNEE  Osteoarthritis of right knee  Patient was diagnosed with severe degenerative arthritis in the right knee and underwent a right total knee arthroplasty by Dr. Bernardo Olguin on the date of admission. Please see dictated operative note for further information.    Hospital course: standard    The patient has done well, with no complications.  Patient denies chest pain, calf pain or shortness of breath.   Pain is well-controlled at present.  Patient is ambulating well with the use of an assistive device, and progressing in physical therapy.   Patient is neurologically and vascularly intact with palpable pedal pulses bilaterally.   Narcotic dosages were chosen by taking into account the the patient's previous history of opoid use and to ensure proper pain control after surgery    Discharge date: 11-5-20    Patient is being discharged to home after am physical therapy.     Allergies:  Latex and Tape       Medication List      START taking these medications      Instructions   acetaminophen 500 MG Tabs  Commonly known as: TYLENOL   Take 1 Tab by mouth every 6 hours.  Dose: 500 mg     aspirin 81 MG EC tablet   Take 1 Tab by mouth 2 Times a Day.  Dose: 81 mg     ondansetron 4 MG Tbdp  Commonly known as: ZOFRAN ODT   Take 1 Tab by mouth every four hours as needed for Nausea.  Dose: 4 mg     oxyCODONE immediate-release 5 MG Tabs  Commonly known as: ROXICODONE   Take 1 Tab by mouth every 6 hours as needed for Severe Pain for up to 10 days.  Dose: 5 mg     traMADol 50 MG Tabs  Commonly known as: ULTRAM   Take 1 Tab by mouth every 6 hours as needed (Moderate Pain (NRS Pain Scale 4-6; CPOT Pain Scale 3-5) if opiates not ordered or tolerated) for up to 10 days.  Dose: 50 mg        CONTINUE taking these medications      Instructions   Colcrys 0.6 MG Tabs  Generic drug: colchicine   Take 0.5 mg by mouth as needed. Twice a week  prn  Dose: 0.5 mg     Diclofenac Sodium 1 % Gel   Apply  to affected area(s) 1 time daily as needed.        STOP taking these medications    ibuprofen 200 MG Tabs  Commonly known as: MOTRIN            Discharge Instructions:     Patient is instructed to ambulate and weight bear as tolerated with the use of an assistive device, and to continue physical therapy exercises given during this hospital stay. Strict posterior hip precautions are to be observed for patients who underwent a total hip replacement.   Patient is to ice and elevate the surgical leg regularly, with pillows under the ankle, nothing is to be placed under the knee.   Patient was given detailed wound care instructions, and will leave the Incisional vac or silver dressing on until first post-op visit.   ASA twice daily for DVT prophylaxis.  Patient is to follow up with Dr. Olguin's office in 1-2 weeks.

## 2020-11-04 NOTE — OP REPORT
Date of Surgery:  11-4-20  Pre-operative Diagnosis:  right knee arthritis    Post-operative Diagnosis:  right knee arthritis    Procedure:  1.right knee replacement using Patient Specific Instrumentation  2.Right knee prepatellar bursectomy    Implants used:  A Smith Nephew Journey II BCS total knee arthroplasty system with the following components  Femur: 6 right BCS Oxinium  Tibia: 5 right  Polyethylene: 9 mm BCS XLPE  Patella: 32 mm oval  Fixation: 2 packages Simplex HV    Surgeon:  Bernardo Olguin MD    1st Assistant:   CK Edmondson    Anesthesiologist:   ELADIO Bazan MD    EBL:  100 cc    Specimen:  none    Indications for procedure:  This patient is a 70 y.o. male with a long standing history of right knee arthritis. The patient had failed conservative therapy including anti-inflammatory medications, activity modifications, injections, physical therapy and assistive devices. He was indicated for a right total knee replacement. The patient expressed an understanding of the risks of pain, bleeding, infection, dislocation, malalignment, need for further surgery, damage to surrounding structures, neurovascular compromise, blood clots, leg-length discrepancy both apparent and actual, anesthetic complications, and serious medical consequences including but not limited to heart attack, stroke or even death. It was explained that the implants are man-made products and thus subject to possible failure.  The patient expressed an understanding and wished to proceed. Upon assessing the patient's individual medical history, they were felt to be an excellent candidate for patient specific instrumentation. The appropriate imaging was obtained and the preoperative plan was received and verified. Specific risks based on the patient's medical history were addressed. A clearance was obtained by the medical physicians and the patient was taken to the operating room on the day of surgery for the above named procedure.     Description  of procedure:  The patient was met in the pre-operative holding area. The right knee was marked as the appropriate surgical site with indelible ink. They were taken to the operating room where the anesthesia department started a adductor/General for intraoperative and post-operative anesthesia and pain control. The patient was placed on the OR table and a tourniquet was placed high on the operative thigh. All bony prominences were padded appropriately. The right knee was prepped and draped in a standard sterile surgical fashion. A time out procedure was called to verify the side and site of surgery, the proposed surgical procedure, and the administration of pre-operative antibiotics. After successful completion of the time out procedure, our attention was turned to the right knee.  The tourniquet was inflated after exsanguination with an Esmarch bandage. The knee was flexed and a straight incision was made just medial to the midline. The patient had a history of a large calcified, aseptic prepatellar bursitis. This was felt to be interfering with his range of motion and would likely limit his recovery from his knee replacement. Therefore the bursa was dissected free, taking care to preserve his skin integrity. The bursa was enucleated and excised. Following his prepatellar bursectomy, the capsule of the knee was cleared and a midvastus arthrotomy was performed. The patella was subluxated laterally and a medial release was performed to properly visualize the posteromedial aspect of the tibial plateau. The knee was extended, the patellar tendon was protected and the infrapatellar fat pad was excised. A lateral release was performed. The patella was turned on its side and held in place with two penetrating towel clips. A free-hand patellar cut was made, the patella was sized and the appropriate lug holes were drilled. The patella was subluxed, the knee was flexed. The tourniquet was released. Hemostasis was obtained.  "Each hemijoint was cleared of soft tissue. The ACL was removed. The patient specific cutting block was verified and pinned into place, and our distal femur cut was made. On the preoperative plan, the femur was sized to a size 6 and the appropriate cutting jig was pinned in place. The cuts and rotation were verified. The bony cuts were made, we noted a \"grand piano sign\" anteriorly. The box for the PS post was cut in the standard fashion.    Now the femur was retracted posteriorly with a lap sponge to protect the intercondylar area. The proximal tibia was exposed, and the PSI guide for the tibia was placed and verified with an extramedullary yasmin. The rotation was set. We made our bony cuts and removed the tibial piece en bloc. The laminar spreaders were placed and the hypercurved osteotomes were used to remove posterior femoral osteophytes. The ligaments were balanced in flexion and extension.   The tibia was then sized to a size 5, as was predicted by the preoperative templating, and the trial component was placed in the proper amount of external rotation. A trial femoral component was placed and with the 9 mm BCS polyethylene we noted full extension without recurvatum and greater than 125 degrees of flexion with appropriate patellar tracking. At this point we removed the trial components and thoroughly irrigated the wound. Osteophytes were removed. The tourniquet was reinflated.  The real components were opened in a sterile fashion on the back table and then cemented into place sequentially, first the tibia, then the femur, then the patella. The cement was allowed to cure with the trial polyethylene component in place. After the cement had hardened and all excess cement was removed, the knee was taken through a range of motion. Again we noted full extension and greater than 125 degrees of flexion with appropriate varus/valgus stability and patellar tracking. Therefore the real polyethylene was opened and inserted " into the tibial tray. An audible click was heard as it engaged the tibia. Now a dilute betadine solution with 3 grams of tranexamic acid was instilled into the knee for 3 minutes before being pulse-lavaged out. The knee was flexed, the arthrotomy was closed with #1 Quill, followed by 2-0 Vicryl in the deep dermal layer in a buried interrupted fashion. The skin was closed with 3-0 monocryl in a running subcuticular fashion, and a sterile Valeria/Acticoat dressing was applied. The patient is currently in the operating room awaiting reversal of anesthesia.

## 2020-11-04 NOTE — ANESTHESIA TIME REPORT
Anesthesia Start and Stop Event Times     Date Time Event    11/4/2020 0835 Ready for Procedure     0845 Anesthesia Start     1011 Anesthesia Stop        Responsible Staff  11/04/20    Name Role Begin End    Randall Bazan M.D. Anesth 0845 1011        Preop Diagnosis (Free Text):  Pre-op Diagnosis     UNILATERAL PRIMARY OSTEOARTHRITIS RIGHT KNEE        Preop Diagnosis (Codes):    Post op Diagnosis  Unilateral primary osteoarthritis, right knee      Premium Reason  Non-Premium    Comments:

## 2020-11-04 NOTE — ANESTHESIA POSTPROCEDURE EVALUATION
Patient: Mychal Cano    Procedure Summary     Date: 11/04/20 Room / Location:  OR 04 / SURGERY [facility]    Anesthesia Start: 0845 Anesthesia Stop:     Procedure: ARTHROPLASTY, KNEE, TOTAL (Right Knee) Diagnosis: (UNILATERAL PRIMARY OSTEOARTHRITIS RIGHT KNEE)    Surgeons: Bernardo Olguin M.D. Responsible Provider: Randall Bazan M.D.    Anesthesia Type: general, peripheral nerve block ASA Status: 2          Final Anesthesia Type: general, peripheral nerve block  Last vitals  BP   Blood Pressure : 103/70    Temp   36.2 °C (97.2 °F)    Pulse   Pulse: 68   Resp   16    SpO2   100 %      Anesthesia Post Evaluation    Patient location during evaluation: PACU  Patient participation: complete - patient participated  Level of consciousness: awake and alert  Pain score: 4    Airway patency: patent  Anesthetic complications: no  Cardiovascular status: hemodynamically stable  Respiratory status: acceptable  Hydration status: euvolemic    PONV: none           Nurse Pain Score: 4 (NPRS)

## 2020-11-04 NOTE — ANESTHESIA PROCEDURE NOTES
Airway    Date/Time: 11/4/2020 8:53 AM  Performed by: Randall Bazan M.D.  Authorized by: Randall Bazan M.D.     Location:  OR  Urgency:  Elective  Difficult Airway: No    Indications for Airway Management:  Anesthesia      Spontaneous Ventilation: absent    Sedation Level:  Deep  Preoxygenated: Yes    MILS Maintained Throughout: No    Mask Difficulty Assessment:  0 - not attempted  Final Airway Type:  Supraglottic airway  Final Supraglottic Airway:  Standard LMA    SGA Size:  5  Number of Attempts at Approach:  1  Ventilation Between Attempts:  None  Number of Other Approaches Attempted:  0

## 2020-11-04 NOTE — PROGRESS NOTES
S:  s/p right TKA  Pain controlled  No N/V, some dizziness with standing, now improved  No Chest Pain/SOB  Afebrile  Exam:    NAD A& O x3    RLE: +DF/PF/EHL SILT L4/L5/S1  LLE:  +DF/PF/EHL SILT L4/L5/S1    Plan:  Continue standard plan of care  Weight bearing: as tolerated with walker/cane  DVT prophylaxis: SCD/Teds + ASA  Dispo: home today if cleared

## 2020-11-04 NOTE — OR NURSING
1006 To PACU from OR via bed s/p right knee arthroscopy. Pt drowsy, respirations spontaneous and non-labored. Surgical dressing to right knee. JAX drain present. Polar care therapy initiated. Toes to affected extremity are pink and warm, brisk cap refill observed, pedal pulse palpable. Pt reports 9/10 pain to his right knee. Pt denies nausea. Pt medicated for pain by anesthesiologist.    1020 No changes. Oral and IV pain medications administered.     1035 No changes. IV pain medication in use. X rays performed.    1045 Pt's wife updated.    1050 No changes. Report to BERNICE Posada.    1105 Resumed care of pt. No changes. Dose of tranexamic acid administered by BERNICE Posada.    1120 Pt reports 8/10 pain. IV pain medication in use.    1135 No changes.    1150 Pt reports 7/10 pain. IV pain medication in use.    1205 Pt reports 4/10 pain while speaking with his anesthesiologist.     1210 Report to GSU JERRY

## 2020-11-05 VITALS
WEIGHT: 152.78 LBS | RESPIRATION RATE: 17 BRPM | HEART RATE: 65 BPM | BODY MASS INDEX: 22.63 KG/M2 | HEIGHT: 69 IN | TEMPERATURE: 97.6 F | OXYGEN SATURATION: 100 % | DIASTOLIC BLOOD PRESSURE: 64 MMHG | SYSTOLIC BLOOD PRESSURE: 103 MMHG

## 2020-11-05 LAB
HCT VFR BLD AUTO: 37.6 % (ref 42–52)
HGB BLD-MCNC: 12.9 G/DL (ref 14–18)

## 2020-11-05 PROCEDURE — 36415 COLL VENOUS BLD VENIPUNCTURE: CPT

## 2020-11-05 PROCEDURE — 85014 HEMATOCRIT: CPT

## 2020-11-05 PROCEDURE — 96376 TX/PRO/DX INJ SAME DRUG ADON: CPT

## 2020-11-05 PROCEDURE — 700111 HCHG RX REV CODE 636 W/ 250 OVERRIDE (IP)

## 2020-11-05 PROCEDURE — A9270 NON-COVERED ITEM OR SERVICE: HCPCS | Performed by: PHYSICIAN ASSISTANT

## 2020-11-05 PROCEDURE — 97165 OT EVAL LOW COMPLEX 30 MIN: CPT

## 2020-11-05 PROCEDURE — 700102 HCHG RX REV CODE 250 W/ 637 OVERRIDE(OP): Performed by: PHYSICIAN ASSISTANT

## 2020-11-05 PROCEDURE — 97162 PT EVAL MOD COMPLEX 30 MIN: CPT

## 2020-11-05 PROCEDURE — 96375 TX/PRO/DX INJ NEW DRUG ADDON: CPT

## 2020-11-05 PROCEDURE — 85018 HEMOGLOBIN: CPT

## 2020-11-05 PROCEDURE — 700111 HCHG RX REV CODE 636 W/ 250 OVERRIDE (IP): Performed by: PHYSICIAN ASSISTANT

## 2020-11-05 PROCEDURE — G0378 HOSPITAL OBSERVATION PER HR: HCPCS

## 2020-11-05 RX ORDER — CEFAZOLIN SODIUM IN 0.9 % NACL 2 G/100 ML
PLASTIC BAG, INJECTION (ML) INTRAVENOUS
Status: COMPLETED
Start: 2020-11-05 | End: 2020-11-05

## 2020-11-05 RX ADMIN — ASPIRIN 81 MG: 81 TABLET, COATED ORAL at 05:39

## 2020-11-05 RX ADMIN — CEFAZOLIN 2000 MG: 1 INJECTION, POWDER, FOR SOLUTION INTRAVENOUS at 00:59

## 2020-11-05 RX ADMIN — TAMSULOSIN HYDROCHLORIDE 0.4 MG: 0.4 CAPSULE ORAL at 09:35

## 2020-11-05 RX ADMIN — OXYCODONE 5 MG: 5 TABLET ORAL at 05:38

## 2020-11-05 RX ADMIN — ACETAMINOPHEN 500 MG: 500 TABLET, FILM COATED ORAL at 05:39

## 2020-11-05 RX ADMIN — KETOROLAC TROMETHAMINE 15 MG: 30 INJECTION, SOLUTION INTRAMUSCULAR at 00:59

## 2020-11-05 RX ADMIN — KETOROLAC TROMETHAMINE 15 MG: 30 INJECTION, SOLUTION INTRAMUSCULAR at 05:39

## 2020-11-05 RX ADMIN — OXYCODONE HYDROCHLORIDE 10 MG: 10 TABLET ORAL at 09:35

## 2020-11-05 RX ADMIN — ACETAMINOPHEN 500 MG: 500 TABLET, FILM COATED ORAL at 00:59

## 2020-11-05 RX ADMIN — DEXAMETHASONE SODIUM PHOSPHATE 8 MG: 4 INJECTION, SOLUTION INTRA-ARTICULAR; INTRALESIONAL; INTRAMUSCULAR; INTRAVENOUS; SOFT TISSUE at 05:39

## 2020-11-05 RX ADMIN — TRAMADOL HYDROCHLORIDE 50 MG: 50 TABLET, FILM COATED ORAL at 02:19

## 2020-11-05 RX ADMIN — OXYCODONE HYDROCHLORIDE 10 MG: 10 TABLET ORAL at 02:19

## 2020-11-05 ASSESSMENT — COGNITIVE AND FUNCTIONAL STATUS - GENERAL
MOBILITY SCORE: 24
HELP NEEDED FOR BATHING: A LITTLE
SUGGESTED CMS G CODE MODIFIER DAILY ACTIVITY: CJ
TOILETING: A LITTLE
SUGGESTED CMS G CODE MODIFIER MOBILITY: CH
PERSONAL GROOMING: A LITTLE
DAILY ACTIVITIY SCORE: 20
DRESSING REGULAR LOWER BODY CLOTHING: A LITTLE

## 2020-11-05 ASSESSMENT — GAIT ASSESSMENTS
DISTANCE (FEET): 150
DEVIATION: STEP TO
GAIT LEVEL OF ASSIST: SUPERVISED
ASSISTIVE DEVICE: FRONT WHEEL WALKER

## 2020-11-05 ASSESSMENT — ACTIVITIES OF DAILY LIVING (ADL): TOILETING: INDEPENDENT

## 2020-11-05 ASSESSMENT — PAIN DESCRIPTION - PAIN TYPE
TYPE: SURGICAL PAIN
TYPE: SURGICAL PAIN

## 2020-11-05 NOTE — THERAPY
"Occupational Therapy   Initial Evaluation     Patient Name: Mychal Cano  Age:  70 y.o., Sex:  male  Medical Record #: [id number]  Today's Date: 11/5/2020     Precautions  Precautions: Weight Bearing As Tolerated Right Lower Extremity    Assessment  Patient is 70 y.o. male admit for R TKA.  Pt will have assist from spouse at home as needed.  Pt up supervised with FWW, needs cues to use walker for mobility as he tends to abandon it.  Jihan for LB dressing, otherwise tolerating simple ADl's well post TKA.  Educated in detail, no further OT needs.  Plan    Recommend Occupational Therapy for Evaluation only    DC Equipment Recommendations: None  Discharge Recommendations: Anticipate that the patient will have no further occupational therapy needs after discharge from the hospital        11/05/20 1016   Prior Living Situation   Prior Services None   Housing / Facility 1 Story House   Steps Into Home 1   Steps In Home 0   Bathroom Set up Walk In Shower;Shower Chair   Equipment Owned Tub / Shower Seat  (\"things to hold on by toilet\")   Lives with - Patient's Self Care Capacity Spouse   Comments Spouse home and able to assist    Prior Level of ADL Function   Self Feeding Independent   Grooming / Hygiene Independent   Bathing Independent   Dressing Independent   Toileting Independent   Prior Level of IADL Function   Medication Management Independent   Laundry Independent   Kitchen Mobility Independent   Finances Independent   Home Management Independent   Shopping Independent   Prior Level Of Mobility Independent Without Device in Community   Driving / Transportation Driving Independent   Occupation (Pre-Hospital Vocational) Retired Due To Age   Leisure Interests Unable To Determine At This Time   Cognition    Comments Slightly impulsive, pt moves quickly and tends to abandon walker.  Needed cues that supervision from staff was requested for toileting   ADL Assessment   Eating Independent   Grooming " Supervision;Standing   Upper Body Dressing Modified Independent   Lower Body Dressing Supervision  (Jihan shoes and socks)   Toileting Supervision  (standing to urinate)   Functional Mobility   Sit to Stand Supervised   Bed, Chair, Wheelchair Transfer Supervised   Transfer Method Stand Pivot   Mobility supervised with FWW   Distance Wheelchair (Feet or Distance) ad janneth in room    Education Group   Additional Comments Educated on safety post TKA and mgmt of JAX dressing with ADl's

## 2020-11-05 NOTE — CARE PLAN
Problem: Safety  Goal: Will remain free from falls  Outcome: PROGRESSING AS EXPECTED  Note: Pt educated to call for help and uses call light appropriately.  Bed locked in lowest position. Call light within reach.     Problem: Knowledge Deficit  Goal: Knowledge of disease process/condition, treatment plan, diagnostic tests, and medications will improve  Outcome: PROGRESSING AS EXPECTED  Note: POC discussed with pt.

## 2020-11-05 NOTE — PROGRESS NOTES
Pt denies numbness/tingling in right leg. Pain controlled, able to dorsal flex and plantar flex, dressing CDI no drainage. VSS. Pt given discharge instructions, pt verbalizes understanding of discharge instructions, all questions answered. IV DC'd. Pt told to follow up with Dr. Olguin. Meets all criteria for DC.

## 2020-11-05 NOTE — PROGRESS NOTES
S:  s/p right TKA and bursectomy  Pain controlled  Stayed overnight with post operative Nausea/Vomiting, improved  No Chest Pain/SOB  Afebrile  Exam:    NAD A& O x3    RLE: +DF/PF/EHL SILT L4/L5/S1  LLE:  +DF/PF/EHL SILT L4/L5/S1    Plan:  Continue standard plan of care  Weight bearing: as tolerated with walker/cane  DVT prophylaxis: SCD/Teds + ASA  Dispo: home today. Zofran prescribed

## 2020-11-05 NOTE — DISCHARGE INSTRUCTIONS
Dr. Olguin's Discharge Instructions:  The first week after your joint replacement is a time to recover from the surgery. We expect light exercise to keep you active and mobile. Dr. Olguin requires a walker or cane for most patients in the first few days following surgery to try to prevent falls or complications.     Most patients are prescribed two medications for pain control: a narcotic such as Oxycodone or Hydrocodone and a milder medication called Tramadol. These can be alternated for pain control, and the priority is to decrease use of the stronger narcotic as soon as tolerated.   Take your pain medication as appropriate to ensure that your pain is not limiting your recovery. You'll be seen in clinic in 7-10 days (this appointment has already been made, call our office if you're unsure of the time). At that time, we'll prescribe your physical therapy to help with the recovery phase.     -Keep dressing clean and dry. Leave dressing in place until follow up. If you have an incisional vac dressing, the battery may die before your first post operative appointment, but you can leave the surgical dressing in place and it will be removed at your clinic visit. The battery of the dressing may die, and the sponge will inflate because it is no longer holding suction. You can still leave the dressing in place and it will be removed at the office. Call the office if you notice drainage from the surgical dressing.    -OK to shower, keep dressing in place. Pat dressing dry, do not rub incision    -Do not soak incision in bath, hot tub or pool    -Follow up with Dr. Olguin at regularly scheduled time    -Call [facility] office at [phone] for questions    -Weight bearing status: as tolerated with walker/cane    -Take medication as prescribed for DVT prophylaxis    Discharge Instructions    Discharged to home by car with relative. Discharged via wheelchair, hospital escort: Yes.  Special equipment needed: Walker    Be sure to  schedule a follow-up appointment with your primary care doctor or any specialists as instructed.     Discharge Plan:        I understand that a diet low in cholesterol, fat, and sodium is recommended for good health. Unless I have been given specific instructions below for another diet, I accept this instruction as my diet prescription.   Other diet: regular as tolerated    Special Instructions: Discharge instructions for the Orthopedic Patient    Follow up with Primary Care Physician within 2 weeks of discharge to home, regarding:  Review of medications and diagnostic testing.  Surveillance for medical complications.  Workup and treatment of osteoporosis, if appropriate.     -Is this a Hip/Knee/Shoulder Joint Replacement patient? Yes   TOTAL KNEE REPLACEMENT, AFTER-CARE GUIDELINES     These instructions provide you with information on caring for yourself and your knee after surgery. Your health care provider may also give you instructions that are more specific. Your treatment was planned and performed according to current medical practices but problems sometimes occur. Call your health care provider if you have any problems or questions.     WHAT TO EXPECT AFTER THE PROCEDURE   After your procedure, your knee will typically be stiff, sore, and bruised. This will improve over time.     Pain   · Follow your home pain management plan as discussed with your nurse and as directed by your provider.   · It is important to follow any scheduled pain medications for maximal pain relief.   · If prescribed opioid medication, the goal is to use opioids only as needed and to wean off prescription pain medicine as soon as possible.   · Ice can be used for pain control.   · Put ice in a plastic bag.   · Place a towel between your skin and the bag.   · Leave the ice on for 20 minutes, 2-3 times a day at a minimum.   · Most patients are off the pain pills by 3 weeks. If your pain continues to be severe, follow up with your  provider.     Infection   Knee joint infections occur in fewer than 2% of patients. The most common causes of infection following total knee replacement surgery are from bacteria that enter the bloodstream during dental procedures, urinary tract infections, or skin infections. These bacteria can lodge around your knee replacement and cause an infection.   · Keep the incision as clean and dry as possible.   · Always wash your hands before touching your incision.   · Avoid dental care for 3 months after surgery. Your provider may recommend taking a dose of antibiotics an hour prior to any dental procedure. After 2 years, most providers recommend antibiotics only before an extensive procedure. Ask your provider what they recommend.   · Signs and symptoms of infection include low-grade fever, redness, pain, swelling and drainage from your incision. Notify your provider IMMEDIATELY if you develop ANY of these symptoms.     Post op Disturbances   · Bowel Habits - Constipation is extremely common and caused by a combination of anesthesia, lack of mobility, dehydration and pain medicine. Use stool softeners or laxatives if necessary. It is important not to ignore this problem as bowel obstructions can be a serious complication after joint replacement surgery.   · Mood/Energy Level - Many patients experience a lack of energy and endurance for up to 2-3 months after surgery. Some people feel down and can even become depressed. This is likely due to postoperative anemia, change in activity level, lack of sleep, pain medicine and just the emotional reaction to the surgery itself that is a big disruption in a person’s life. This usually passes. If symptoms persist, follow up with your primary care provider.  · Returning to Work - Your provider will give you specific instructions based on your profession. Generally, if you work a sedentary job requiring little standing or walking, most patients may return within 2-6 weeks. Manual  labor jobs involving walking, lifting and standing may take 3-4 months. Your provider’s office can provide a release to part-time or light duty work early on in your recovery and progress you to full duty as able.   · Driving - You can begin driving once cleared by your provider, provided you are no longer taking narcotic pain medication or any other medications that impair driving. Discuss the length of time expected with your provider as returning to driving depends on things such as your vehicle, which knee was replaced (right or left), and knee motion, strength and reflexes returning appropriately.   · Avoiding falls - A fall during the first few weeks after surgery can damage your new knee and may result in a need for further surgery.  throw rugs and tack down loose carpeting. Be aware of floor hazards such as pets, small objects or uneven surfaces. Notify your provider of any falls.   · [employer] Metal Detectors - The sensitivity of metal detectors varies and it is likely that your prosthesis will cause an alarm. Inform the  of your artificial joint.     Diet   · Resume your normal diet as tolerated.   · It is important to achieve a healthy nutritional status by eating a well-balanced diet on a regular basis.   · Your provider may recommend that you take iron and vitamin supplements.   · Continue to drink plenty of fluids.     Shower/Bathing   · You may shower as soon as you get home from the hospital unless otherwise instructed.   · Keep your incision out of water to prevent infection. To keep the incision dry when showering, cover it with a plastic bag or plastic wrap. If your bandage is waterproof, this may not be necessary. o Pat incision dry if it gets wet. Do not rub. Notify your provider.   · Do not submerge in a bath until cleared by your provider. Your staples must be out and the incision completely healed.     Dressing Change: Only change your dressing if directed by your  provider.   · Wash hands.   · Open all dressing change materials.   · Remove old dressing and discard.   · Inspect incision for signs of irritation or infection including redness, increase in clear drainage, yellow/green drainage, odor and surrounding skin hot to touch. Notify your provider if present.   ·  the new dressing by one corner and lay over the incision. Be careful not to touch the inside of the dressing that will lay over the incision.   · Secure in place as instructed. Swelling/Bruising   · Swelling is normal after knee replacement and can involve the thigh, knee, calf and foot.   · Swelling can last from 3-6 months.   · To reduce swelling, elevate your leg higher than your heart while reclining. The first week you are home you should elevate your leg an equal amount of time as you are active.   · The swelling is usually worse after you go home since you are upright for longer periods of time.   · Bruising often does not appear until after you arrive home and can be quite dramatic- appearing purple, black, or green. Bruising is typically not concerning and will subside without any treatment.     Blood Clot Prevention   Your treatment plan includes multiple preventative measures to decrease the risk of blood clots in the legs (DVTs) and the less common, but serious, clots that travel to the lungs (pulmonary emboli). Most patients are at standard risk for them, but people who are at higher risk include those who have had previous clots, a family history of clotting, smoking, diabetes, obesity, advanced age, use estrogen and/or live a sedentary lifestyle.     · Signs of blood clots in legs include - Swelling in thigh, calf or ankle that does not go down with elevation. Pain, heat and tenderness in calf, back of calf or groin area. NOTE: blood clots can occur in either leg.   · Signs of blood clots in lungs include - Sudden increased shortness of breath, sudden onset of chest pain, and localized chest  pain with coughing.   · If you experience any of the above symptoms, notify your provider and seek medical attention immediately.   · You received anticoagulant therapy (blood thinners) in the hospital. Continue the prescribed blood-thinning medication at home, as directed by your provider.   · Your risk for developing a clot continues for up to 2-3 months after surgery. Avoid prolonged sitting and dehydration (long air trips and car trips). If you do take a trip during this time, please get up, move around every 1-1.5 hours, and discuss all travel plans with your provider.     Activity   Once home, stay active. The key is not to overdo it. While you can expect some good days and some bad days, you should notice a gradual improvement and a gradual increase in your endurance over the next 6 to 12 months. Exercise is a critical component of recovery, particularly during the first few weeks after surgery.     · Normal activities of daily living - Expect to resume most within 3 to 6 weeks following surgery. Some pain with activity and at night is common for several weeks after surgery. Walk as much as you like once your doctor gives permission to proceed, but remember that walking is no substitute for the exercises your doctor and physical therapist prescribe. Use a walker, crutches or cane to assist with walking until you can walk smoothly (minimal or no limp) without assistance.   · Physical Therapy Exercises - Follow your home exercise program as instructed by your physical therapist during your hospital stay. Call and set up outpatient physical therapy appointments per your provider’s recommendations. Physical therapy after the hospital stay focuses on increasing your range of motion, strengthening your muscles and improving your gait/walking pattern. Contact your provider for the referral to outpatient physical therapy if you have not yet received this. -   · Riding a stationary bicycle can help maintain muscle  tone and keep your knee flexible. Begin stationary bicycling as directed by your physical therapist or provider.   · Sexual Activity - Your provider can tell you when it safe to resume sexual activity.   · Sleeping Positions - You can safely sleep on your back, on either side, or on your stomach.   · Other Activities - Lower impact activities are preferred. Consult your provider if you have specific questions.     When to Call the Doctor   Call the provider if you experience:   · Fever over 100.5° F   · Increased pain, drainage, redness, odor or heat around the incision area   · Shaking chills   · Increased knee pain with activity and rest   · Increased pain in calf, tenderness or redness above or below the knee   · Increased swelling of calf, ankle, foot   · Sudden increased shortness of breath, sudden onset of chest pain, localized chest pain with coughing   · Incision opening   Or, if there are any questions or concerns about medications or care.     Infection statistic resource:   https://[email or web address]/contents/prosthetic-joint-infection-epidemiology-microbiology-clinical-manifestations-and-diagnosis     -Is this patient being discharged with medication to prevent blood clots?  Yes, Aspirin Aspirin, ASA oral tablets  What is this medicine?  ASPIRIN (AS pir in) is a pain reliever. It is used to treat mild pain and fever. This medicine is also used as directed by a doctor to prevent and to treat heart attacks, to prevent strokes and blood clots, and to treat arthritis or inflammation.  This medicine may be used for other purposes; ask your health care provider or pharmacist if you have questions.  COMMON BRAND NAME(S): Aspir-Low, Aspir-Samira, Aspirtab, Isaura Advanced Aspirin, Isaura Aspirin, Isaura Aspirin Extra Strength, Isaura Aspirin Plus, Isaura Extra Strength, Isaura Extra Strength Plus, Isaura Genuine Aspirin, Isaura Womens Aspirin, Bufferin, Bufferin Extra Strength, Bufferin Low Dose  What should I tell my  health care provider before I take this medicine?  They need to know if you have any of these conditions:  · anemia  · asthma  · bleeding problems  · child with chickenpox, the flu, or other viral infection  · diabetes  · gout  · if you frequently drink alcohol containing drinks  · kidney disease  · liver disease  · low level of vitamin K  · lupus  · smoke tobacco  · stomach ulcers or other problems  · an unusual or allergic reaction to aspirin, tartrazine dye, other medicines, dyes, or preservatives  · pregnant or trying to get pregnant  · breast-feeding  How should I use this medicine?  Take this medicine by mouth with a glass of water. Follow the directions on the package or prescription label. You can take this medicine with or without food. If it upsets your stomach, take it with food. Do not take your medicine more often than directed.  Talk to your pediatrician regarding the use of this medicine in children. While this drug may be prescribed for children as young as 12 years of age for selected conditions, precautions do apply. Children and teenagers should not use this medicine to treat chicken pox or flu symptoms unless directed by a doctor.  Patients over 65 years old may have a stronger reaction and need a smaller dose.  Overdosage: If you think you have taken too much of this medicine contact a poison control center or emergency room at once.  NOTE: This medicine is only for you. Do not share this medicine with others.  What if I miss a dose?  If you are taking this medicine on a regular schedule and miss a dose, take it as soon as you can. If it is almost time for your next dose, take only that dose. Do not take double or extra doses.  What may interact with this medicine?  Do not take this medicine with any of the following medications:  · cidofovir  · ketorolac  · probenecid  This medicine may also interact with the following medications:  · alcohol  · alendronate  · bismuth  subsalicylate  · flavocoxid  · herbal supplements like feverfew, garlic, alissa, ginkgo biloba, horse chestnut  · medicines for diabetes or glaucoma like acetazolamide, methazolamide  · medicines for gout  · medicines that treat or prevent blood clots like enoxaparin, heparin, ticlopidine, warfarin  · other aspirin and aspirin-like medicines  · NSAIDs, medicines for pain and inflammation, like ibuprofen or naproxen  · pemetrexed  · sulfinpyrazone  · varicella live vaccine  This list may not describe all possible interactions. Give your health care provider a list of all the medicines, herbs, non-prescription drugs, or dietary supplements you use. Also tell them if you smoke, drink alcohol, or use illegal drugs. Some items may interact with your medicine.  What should I watch for while using this medicine?  If you are treating yourself for pain, tell your doctor or health care professional if the pain lasts more than 10 days, if it gets worse, or if there is a new or different kind of pain. Tell your doctor if you see redness or swelling. Also, check with your doctor if you have a fever that lasts for more than 3 days. Only take this medicine to prevent heart attacks or blood clotting if prescribed by your doctor or health care professional.  Do not take aspirin or aspirin-like medicines with this medicine. Too much aspirin can be dangerous. Always read the labels carefully.  This medicine can irritate your stomach or cause bleeding problems. Do not smoke cigarettes or drink alcohol while taking this medicine. Do not lie down for 30 minutes after taking this medicine to prevent irritation to your throat.  If you are scheduled for any medical or dental procedure, tell your healthcare provider that you are taking this medicine. You may need to stop taking this medicine before the procedure.  This medicine may be used to treat migraines. If you take migraine medicines for 10 or more days a month, your migraines may get  worse. Keep a diary of headache days and medicine use. Contact your healthcare professional if your migraine attacks occur more frequently.  What side effects may I notice from receiving this medicine?  Side effects that you should report to your doctor or health care professional as soon as possible:  · allergic reactions like skin rash, itching or hives, swelling of the face, lips, or tongue  · breathing problems  · changes in hearing, ringing in the ears  · confusion  · general ill feeling or flu-like symptoms  · pain on swallowing  · redness, blistering, peeling or loosening of the skin, including inside the mouth or nose  · signs and symptoms of bleeding such as bloody or black, tarry stools; red or dark-brown urine; spitting up blood or brown material that looks like coffee grounds; red spots on the skin; unusual bruising or bleeding from the eye, gums, or nose  · trouble passing urine or change in the amount of urine  · unusually weak or tired  · yellowing of the eyes or skin  Side effects that usually do not require medical attention (report to your doctor or health care professional if they continue or are bothersome):  · diarrhea or constipation  · headache  · nausea, vomiting  · stomach gas, heartburn  This list may not describe all possible side effects. Call your doctor for medical advice about side effects. You may report side effects to FDA at 2-851-ANL-6221.  Where should I keep my medicine?  Keep out of the reach of children.  Store at room temperature between 15 and 30 degrees C (59 and 86 degrees F). Protect from heat and moisture. Do not use this medicine if it has a strong vinegar smell. Throw away any unused medicine after the expiration date.  NOTE: This sheet is a summary. It may not cover all possible information. If you have questions about this medicine, talk to your doctor, pharmacist, or health care provider.  © 2020 [employer] (2018-01-30 10:42:13)      · Is patient  discharged on Warfarin / Coumadin?   No     Depression / Suicide Risk    As you are discharged from this [facility] facility, it is important to learn how to keep safe from harming yourself.    Recognize the warning signs:  · Abrupt changes in personality, positive or negative- including increase in energy   · Giving away possessions  · Change in eating patterns- significant weight changes-  positive or negative  · Change in sleeping patterns- unable to sleep or sleeping all the time   · Unwillingness or inability to communicate  · Depression  · Unusual sadness, discouragement and loneliness  · Talk of wanting to die  · Neglect of personal appearance   · Rebelliousness- reckless behavior  · Withdrawal from people/activities they love  · Confusion- inability to concentrate     If you or a loved one observes any of these behaviors or has concerns about self-harm, here's what you can do:  · Talk about it- your feelings and reasons for harming yourself  · Remove any means that you might use to hurt yourself (examples: pills, rope, extension cords, firearm)  · Get professional help from the community (Mental Health, Substance Abuse, psychological counseling)  · Do not be alone:Call your Safe Contact- someone whom you trust who will be there for you.  · Call your local CRISIS HOTLINE 875-4036 or 124-615-6707  · Call your local Children's Mobile Crisis Response Team [place] (757) 089-2286 or www.[employer]  · Call the toll free National Suicide Prevention Hotlines   · National Suicide Prevention Lifeline 473-749-FQIA (1402)  · National Hope Line Network 800-SUICIDE (799-1450)

## 2020-11-05 NOTE — PROGRESS NOTES
0700: Bedside report from Brisa FOOTE RN.  Pt wakes to voice.   Pt A&O x 4.  Pt resting comfortably in bed.   Knee in locked position in bed, education for knee replacement given.   VSS.   Pt belongings within reach.  Pt denies pain and nausea at this time.   Call light within reach.  No needs at this time.  Will continue to monitor.        Pending PT/OT.       0830: Rounded with Dr. Olguin. Pt good to go home after PT/OT.

## 2020-11-05 NOTE — PROGRESS NOTES
Received report and assumed care. Pt AOx4.   Reporting 5/10 R knee pain and was medicated per MAR.  Denies nausea or SOB.  CMS intact.  2500 on IS.  Plan of care discussed. No other needs at this time. Bed locked in lowest position. Call light within reach.

## 2020-11-05 NOTE — THERAPY
Physical Therapy   Initial Evaluation     Patient Name: Mcyhal Cano  Age:  70 y.o., Sex:  male  Medical Record #: [id number]  Today's Date: 11/5/2020     Precautions: (P) Weight Bearing As Tolerated Right Lower Extremity    Assessment  Patient is 70 y.o. male with a diagnosis of R TKR Pt lives at home with wife and is active Pt is safe with transfers,ambulation and stairs He understands HEP and has no equipment needs.      Plan    Recommend Physical Therapy for Evaluation only      11/05/20 0900   Prior Living Situation   Prior Services None   Housing / Facility 1 Story House   Steps Into Home 1   Steps In Home 0   Equipment Owned Front-Wheel Walker   Lives with - Patient's Self Care Capacity Spouse   Prior Level of Functional Mobility   Bed Mobility Independent   Transfer Status Independent   Ambulation Independent   Distance Ambulation (Feet)   (community amb)   Assistive Devices Used None   Stairs Independent   Passive ROM Lower Body   Rt Knee Flexion Degrees 90   Rt Knee Extension Degrees 0   Balance Assessment   Sitting Balance (Static) Good   Sitting Balance (Dynamic) Good   Standing Balance (Static) Good   Standing Balance (Dynamic) Good   Weight Shift Sitting Good   Weight Shift Standing Good   Gait Analysis   Gait Level Of Assist Supervised   Assistive Device Front Wheel Walker   Distance (Feet) 150   # of Times Distance was Traveled 2   Deviation Step To   # of Stairs Climbed 1   Level of Assist with Stairs Supervised   Weight Bearing Status wbat R   Bed Mobility    Supine to Sit Modified Independent   Sit to Supine Modified Independent   Scooting Modified Independent   Functional Mobility   Sit to Stand Supervised   Bed, Chair, Wheelchair Transfer Supervised   Transfer Method Stand Pivot   Activity Tolerance   Sitting Edge of Bed 10   Standing 10   Patient / Family Goals    Patient / Family Goal #1 Home   Anticipated Discharge Equipment and Recommendations   DC Equipment Recommendations None    Discharge Recommendations Recommend outpatient physical therapy services to address higher level deficits       DC Equipment Recommendations: (P) None  Discharge Recommendations: (P) Recommend outpatient physical therapy services to address higher level deficits

## 2020-11-11 ENCOUNTER — APPOINTMENT (OUTPATIENT)
Dept: RADIOLOGY | Facility: MEDICAL CENTER | Age: 70
DRG: 561 | End: 2020-11-11
Attending: EMERGENCY MEDICINE
Payer: MEDICARE

## 2020-11-11 ENCOUNTER — ANESTHESIA EVENT (OUTPATIENT)
Dept: SURGERY | Facility: MEDICAL CENTER | Age: 70
DRG: 561 | End: 2020-11-11
Payer: MEDICARE

## 2020-11-11 ENCOUNTER — ANESTHESIA (OUTPATIENT)
Dept: SURGERY | Facility: MEDICAL CENTER | Age: 70
DRG: 561 | End: 2020-11-11
Payer: MEDICARE

## 2020-11-11 ENCOUNTER — HOSPITAL ENCOUNTER (INPATIENT)
Facility: MEDICAL CENTER | Age: 70
LOS: 1 days | DRG: 561 | End: 2020-11-12
Attending: EMERGENCY MEDICINE | Admitting: ORTHOPAEDIC SURGERY
Payer: MEDICARE

## 2020-11-11 DIAGNOSIS — M17.11 PRIMARY OSTEOARTHRITIS OF RIGHT KNEE: ICD-10-CM

## 2020-11-11 DIAGNOSIS — G89.18 POST-OP PAIN: ICD-10-CM

## 2020-11-11 LAB
ALBUMIN SERPL BCP-MCNC: 2.7 G/DL (ref 3.2–4.9)
ALBUMIN/GLOB SERPL: 0.6 G/DL
ALP SERPL-CCNC: 299 U/L (ref 30–99)
ALT SERPL-CCNC: 25 U/L (ref 2–50)
ANION GAP SERPL CALC-SCNC: 15 MMOL/L (ref 7–16)
AST SERPL-CCNC: 22 U/L (ref 12–45)
BASOPHILS # BLD AUTO: 0.5 % (ref 0–1.8)
BASOPHILS # BLD: 0.06 K/UL (ref 0–0.12)
BILIRUB SERPL-MCNC: 1.4 MG/DL (ref 0.1–1.5)
BUN SERPL-MCNC: 22 MG/DL (ref 8–22)
CALCIUM SERPL-MCNC: 9.1 MG/DL (ref 8.4–10.2)
CHLORIDE SERPL-SCNC: 96 MMOL/L (ref 96–112)
CO2 SERPL-SCNC: 25 MMOL/L (ref 20–33)
COVID ORDER STATUS COVID19: NORMAL
CREAT SERPL-MCNC: 1.18 MG/DL (ref 0.5–1.4)
EOSINOPHIL # BLD AUTO: 0.15 K/UL (ref 0–0.51)
EOSINOPHIL NFR BLD: 1.3 % (ref 0–6.9)
ERYTHROCYTE [DISTWIDTH] IN BLOOD BY AUTOMATED COUNT: 44.3 FL (ref 35.9–50)
GLOBULIN SER CALC-MCNC: 4.4 G/DL (ref 1.9–3.5)
GLUCOSE SERPL-MCNC: 86 MG/DL (ref 65–99)
HCT VFR BLD AUTO: 37.2 % (ref 42–52)
HGB BLD-MCNC: 12.5 G/DL (ref 14–18)
IMM GRANULOCYTES # BLD AUTO: 0.08 K/UL (ref 0–0.11)
IMM GRANULOCYTES NFR BLD AUTO: 0.7 % (ref 0–0.9)
LYMPHOCYTES # BLD AUTO: 2.12 K/UL (ref 1–4.8)
LYMPHOCYTES NFR BLD: 18.9 % (ref 22–41)
MCH RBC QN AUTO: 32.9 PG (ref 27–33)
MCHC RBC AUTO-ENTMCNC: 33.6 G/DL (ref 33.7–35.3)
MCV RBC AUTO: 97.9 FL (ref 81.4–97.8)
MONOCYTES # BLD AUTO: 1.43 K/UL (ref 0–0.85)
MONOCYTES NFR BLD AUTO: 12.8 % (ref 0–13.4)
NEUTROPHILS # BLD AUTO: 7.35 K/UL (ref 1.82–7.42)
NEUTROPHILS NFR BLD: 65.8 % (ref 44–72)
NRBC # BLD AUTO: 0 K/UL
NRBC BLD-RTO: 0 /100 WBC
PLATELET # BLD AUTO: 315 K/UL (ref 164–446)
PMV BLD AUTO: 10.1 FL (ref 9–12.9)
POTASSIUM SERPL-SCNC: 4.7 MMOL/L (ref 3.6–5.5)
PROT SERPL-MCNC: 7.1 G/DL (ref 6–8.2)
RBC # BLD AUTO: 3.8 M/UL (ref 4.7–6.1)
SARS-COV-2 RNA RESP QL NAA+PROBE: NOTDETECTED
SODIUM SERPL-SCNC: 136 MMOL/L (ref 135–145)
SPECIMEN SOURCE: NORMAL
WBC # BLD AUTO: 11.2 K/UL (ref 4.8–10.8)

## 2020-11-11 PROCEDURE — 700101 HCHG RX REV CODE 250: Performed by: ANESTHESIOLOGY

## 2020-11-11 PROCEDURE — U0003 INFECTIOUS AGENT DETECTION BY NUCLEIC ACID (DNA OR RNA); SEVERE ACUTE RESPIRATORY SYNDROME CORONAVIRUS 2 (SARS-COV-2) (CORONAVIRUS DISEASE [COVID-19]), AMPLIFIED PROBE TECHNIQUE, MAKING USE OF HIGH THROUGHPUT TECHNOLOGIES AS DESCRIBED BY CMS-2020-01-R: HCPCS

## 2020-11-11 PROCEDURE — 3E0T3BZ INTRODUCTION OF ANESTHETIC AGENT INTO PERIPHERAL NERVES AND PLEXI, PERCUTANEOUS APPROACH: ICD-10-PCS | Performed by: ANESTHESIOLOGY

## 2020-11-11 PROCEDURE — A4306 DRUG DELIVERY SYSTEM <=50 ML: HCPCS | Performed by: ANESTHESIOLOGY

## 2020-11-11 PROCEDURE — 96372 THER/PROPH/DIAG INJ SC/IM: CPT

## 2020-11-11 PROCEDURE — 700111 HCHG RX REV CODE 636 W/ 250 OVERRIDE (IP): Performed by: EMERGENCY MEDICINE

## 2020-11-11 PROCEDURE — 36415 COLL VENOUS BLD VENIPUNCTURE: CPT

## 2020-11-11 PROCEDURE — 770006 HCHG ROOM/CARE - MED/SURG/GYN SEMI*

## 2020-11-11 PROCEDURE — 700111 HCHG RX REV CODE 636 W/ 250 OVERRIDE (IP): Performed by: ANESTHESIOLOGY

## 2020-11-11 PROCEDURE — 99285 EMERGENCY DEPT VISIT HI MDM: CPT

## 2020-11-11 PROCEDURE — 73560 X-RAY EXAM OF KNEE 1 OR 2: CPT | Mod: RT

## 2020-11-11 PROCEDURE — 700102 HCHG RX REV CODE 250 W/ 637 OVERRIDE(OP): Performed by: PHYSICIAN ASSISTANT

## 2020-11-11 PROCEDURE — 94760 N-INVAS EAR/PLS OXIMETRY 1: CPT

## 2020-11-11 PROCEDURE — 700111 HCHG RX REV CODE 636 W/ 250 OVERRIDE (IP): Performed by: PHYSICIAN ASSISTANT

## 2020-11-11 PROCEDURE — 85025 COMPLETE CBC W/AUTO DIFF WBC: CPT

## 2020-11-11 PROCEDURE — 80053 COMPREHEN METABOLIC PANEL: CPT

## 2020-11-11 PROCEDURE — A9270 NON-COVERED ITEM OR SERVICE: HCPCS | Performed by: PHYSICIAN ASSISTANT

## 2020-11-11 PROCEDURE — 700105 HCHG RX REV CODE 258: Performed by: ANESTHESIOLOGY

## 2020-11-11 PROCEDURE — 700105 HCHG RX REV CODE 258: Performed by: PHYSICIAN ASSISTANT

## 2020-11-11 RX ORDER — DOCUSATE SODIUM 100 MG/1
100 CAPSULE, LIQUID FILLED ORAL 2 TIMES DAILY
Status: DISCONTINUED | OUTPATIENT
Start: 2020-11-11 | End: 2020-11-12 | Stop reason: HOSPADM

## 2020-11-11 RX ORDER — BUPIVACAINE HYDROCHLORIDE AND EPINEPHRINE 2.5; 5 MG/ML; UG/ML
INJECTION, SOLUTION EPIDURAL; INFILTRATION; INTRACAUDAL; PERINEURAL PRN
Status: DISCONTINUED | OUTPATIENT
Start: 2020-11-11 | End: 2020-11-11 | Stop reason: SURG

## 2020-11-11 RX ORDER — SODIUM CHLORIDE 9 MG/ML
INJECTION, SOLUTION INTRAVENOUS CONTINUOUS
Status: ACTIVE | OUTPATIENT
Start: 2020-11-11 | End: 2020-11-12

## 2020-11-11 RX ORDER — CHLORPROMAZINE HYDROCHLORIDE 25 MG/1
25 TABLET, FILM COATED ORAL EVERY 6 HOURS PRN
Status: DISCONTINUED | OUTPATIENT
Start: 2020-11-11 | End: 2020-11-12 | Stop reason: HOSPADM

## 2020-11-11 RX ORDER — ONDANSETRON 4 MG/1
4 TABLET, ORALLY DISINTEGRATING ORAL ONCE
Status: COMPLETED | OUTPATIENT
Start: 2020-11-11 | End: 2020-11-11

## 2020-11-11 RX ORDER — HYDROMORPHONE HYDROCHLORIDE 1 MG/ML
1 INJECTION, SOLUTION INTRAMUSCULAR; INTRAVENOUS; SUBCUTANEOUS
Status: DISCONTINUED | OUTPATIENT
Start: 2020-11-11 | End: 2020-11-12 | Stop reason: HOSPADM

## 2020-11-11 RX ORDER — CELECOXIB 200 MG/1
200 CAPSULE ORAL 2 TIMES DAILY
Status: DISCONTINUED | OUTPATIENT
Start: 2020-11-12 | End: 2020-11-12 | Stop reason: HOSPADM

## 2020-11-11 RX ORDER — SODIUM CHLORIDE, SODIUM LACTATE, POTASSIUM CHLORIDE, CALCIUM CHLORIDE 600; 310; 30; 20 MG/100ML; MG/100ML; MG/100ML; MG/100ML
INJECTION, SOLUTION INTRAVENOUS
Status: DISCONTINUED | OUTPATIENT
Start: 2020-11-11 | End: 2020-11-11 | Stop reason: SURG

## 2020-11-11 RX ORDER — POLYETHYLENE GLYCOL 3350 17 G/17G
1 POWDER, FOR SOLUTION ORAL 2 TIMES DAILY PRN
Status: DISCONTINUED | OUTPATIENT
Start: 2020-11-11 | End: 2020-11-12 | Stop reason: HOSPADM

## 2020-11-11 RX ORDER — DIPHENHYDRAMINE HYDROCHLORIDE 50 MG/ML
25 INJECTION INTRAMUSCULAR; INTRAVENOUS EVERY 6 HOURS PRN
Status: DISCONTINUED | OUTPATIENT
Start: 2020-11-11 | End: 2020-11-12 | Stop reason: HOSPADM

## 2020-11-11 RX ORDER — OXYCODONE HCL 10 MG/1
10 TABLET, FILM COATED, EXTENDED RELEASE ORAL EVERY 12 HOURS
Status: DISCONTINUED | OUTPATIENT
Start: 2020-11-11 | End: 2020-11-12 | Stop reason: HOSPADM

## 2020-11-11 RX ORDER — GABAPENTIN 300 MG/1
300 CAPSULE ORAL 3 TIMES DAILY
Status: DISCONTINUED | OUTPATIENT
Start: 2020-11-11 | End: 2020-11-12 | Stop reason: HOSPADM

## 2020-11-11 RX ORDER — OXYCODONE HYDROCHLORIDE 10 MG/1
10 TABLET ORAL
Status: DISCONTINUED | OUTPATIENT
Start: 2020-11-11 | End: 2020-11-12 | Stop reason: HOSPADM

## 2020-11-11 RX ORDER — AMOXICILLIN 250 MG
1 CAPSULE ORAL
Status: DISCONTINUED | OUTPATIENT
Start: 2020-11-11 | End: 2020-11-12 | Stop reason: HOSPADM

## 2020-11-11 RX ORDER — DIPHENHYDRAMINE HCL 25 MG
25 TABLET ORAL NIGHTLY PRN
Status: DISCONTINUED | OUTPATIENT
Start: 2020-11-12 | End: 2020-11-12 | Stop reason: HOSPADM

## 2020-11-11 RX ORDER — TRAMADOL HYDROCHLORIDE 50 MG/1
50 TABLET ORAL EVERY 4 HOURS PRN
Status: DISCONTINUED | OUTPATIENT
Start: 2020-11-11 | End: 2020-11-12 | Stop reason: HOSPADM

## 2020-11-11 RX ORDER — MORPHINE SULFATE 10 MG/ML
6 INJECTION, SOLUTION INTRAMUSCULAR; INTRAVENOUS ONCE
Status: COMPLETED | OUTPATIENT
Start: 2020-11-11 | End: 2020-11-11

## 2020-11-11 RX ORDER — DEXAMETHASONE SODIUM PHOSPHATE 4 MG/ML
4 INJECTION, SOLUTION INTRA-ARTICULAR; INTRALESIONAL; INTRAMUSCULAR; INTRAVENOUS; SOFT TISSUE
Status: DISCONTINUED | OUTPATIENT
Start: 2020-11-11 | End: 2020-11-12 | Stop reason: HOSPADM

## 2020-11-11 RX ORDER — COLCHICINE 0.6 MG/1
0.6 TABLET ORAL 2 TIMES DAILY
Status: DISCONTINUED | OUTPATIENT
Start: 2020-11-11 | End: 2020-11-12 | Stop reason: HOSPADM

## 2020-11-11 RX ORDER — TAMSULOSIN HYDROCHLORIDE 0.4 MG/1
0.4 CAPSULE ORAL
Status: DISCONTINUED | OUTPATIENT
Start: 2020-11-11 | End: 2020-11-12 | Stop reason: HOSPADM

## 2020-11-11 RX ORDER — CHLORPROMAZINE HYDROCHLORIDE 25 MG/ML
25 INJECTION INTRAMUSCULAR EVERY 6 HOURS PRN
Status: DISCONTINUED | OUTPATIENT
Start: 2020-11-11 | End: 2020-11-12 | Stop reason: HOSPADM

## 2020-11-11 RX ORDER — OXYCODONE HYDROCHLORIDE 10 MG/1
20 TABLET ORAL
Status: DISCONTINUED | OUTPATIENT
Start: 2020-11-11 | End: 2020-11-12 | Stop reason: HOSPADM

## 2020-11-11 RX ORDER — DIPHENHYDRAMINE HCL 25 MG
25 TABLET ORAL EVERY 6 HOURS PRN
Status: DISCONTINUED | OUTPATIENT
Start: 2020-11-11 | End: 2020-11-12 | Stop reason: HOSPADM

## 2020-11-11 RX ORDER — BISACODYL 10 MG
10 SUPPOSITORY, RECTAL RECTAL
Status: DISCONTINUED | OUTPATIENT
Start: 2020-11-11 | End: 2020-11-12 | Stop reason: HOSPADM

## 2020-11-11 RX ORDER — AMOXICILLIN 250 MG
1 CAPSULE ORAL NIGHTLY
Status: DISCONTINUED | OUTPATIENT
Start: 2020-11-11 | End: 2020-11-12 | Stop reason: HOSPADM

## 2020-11-11 RX ORDER — ENEMA 19; 7 G/133ML; G/133ML
1 ENEMA RECTAL
Status: DISCONTINUED | OUTPATIENT
Start: 2020-11-11 | End: 2020-11-12 | Stop reason: HOSPADM

## 2020-11-11 RX ORDER — KETOROLAC TROMETHAMINE 30 MG/ML
15 INJECTION, SOLUTION INTRAMUSCULAR; INTRAVENOUS EVERY 6 HOURS
Status: DISCONTINUED | OUTPATIENT
Start: 2020-11-11 | End: 2020-11-12 | Stop reason: HOSPADM

## 2020-11-11 RX ORDER — ONDANSETRON 2 MG/ML
4 INJECTION INTRAMUSCULAR; INTRAVENOUS EVERY 4 HOURS PRN
Status: DISCONTINUED | OUTPATIENT
Start: 2020-11-11 | End: 2020-11-12 | Stop reason: HOSPADM

## 2020-11-11 RX ORDER — HALOPERIDOL 5 MG/ML
1 INJECTION INTRAMUSCULAR EVERY 6 HOURS PRN
Status: DISCONTINUED | OUTPATIENT
Start: 2020-11-11 | End: 2020-11-12 | Stop reason: HOSPADM

## 2020-11-11 RX ORDER — ONDANSETRON 4 MG/1
4 TABLET, ORALLY DISINTEGRATING ORAL EVERY 4 HOURS PRN
Status: DISCONTINUED | OUTPATIENT
Start: 2020-11-11 | End: 2020-11-12 | Stop reason: HOSPADM

## 2020-11-11 RX ORDER — SCOLOPAMINE TRANSDERMAL SYSTEM 1 MG/1
1 PATCH, EXTENDED RELEASE TRANSDERMAL
Status: DISCONTINUED | OUTPATIENT
Start: 2020-11-11 | End: 2020-11-12 | Stop reason: HOSPADM

## 2020-11-11 RX ORDER — ACETAMINOPHEN 500 MG
1000 TABLET ORAL EVERY 6 HOURS
Status: DISCONTINUED | OUTPATIENT
Start: 2020-11-11 | End: 2020-11-12 | Stop reason: HOSPADM

## 2020-11-11 RX ADMIN — SODIUM CHLORIDE, POTASSIUM CHLORIDE, SODIUM LACTATE AND CALCIUM CHLORIDE: 600; 310; 30; 20 INJECTION, SOLUTION INTRAVENOUS at 18:23

## 2020-11-11 RX ADMIN — COLCHICINE 0.6 MG: 0.6 TABLET, FILM COATED ORAL at 19:42

## 2020-11-11 RX ADMIN — KETOROLAC TROMETHAMINE 15 MG: 30 INJECTION, SOLUTION INTRAMUSCULAR; INTRAVENOUS at 19:42

## 2020-11-11 RX ADMIN — ACETAMINOPHEN 1000 MG: 500 TABLET, FILM COATED ORAL at 19:41

## 2020-11-11 RX ADMIN — ONDANSETRON 4 MG: 4 TABLET, ORALLY DISINTEGRATING ORAL at 12:19

## 2020-11-11 RX ADMIN — OXYCODONE HYDROCHLORIDE 10 MG: 10 TABLET, FILM COATED, EXTENDED RELEASE ORAL at 19:42

## 2020-11-11 RX ADMIN — GABAPENTIN 300 MG: 300 CAPSULE ORAL at 19:41

## 2020-11-11 RX ADMIN — BUPIVACAINE HYDROCHLORIDE AND EPINEPHRINE 20 ML: 2.5; 5 INJECTION, SOLUTION EPIDURAL; INFILTRATION; INTRACAUDAL; PERINEURAL at 18:26

## 2020-11-11 RX ADMIN — MORPHINE SULFATE 6 MG: 10 INJECTION INTRAVENOUS at 12:19

## 2020-11-11 RX ADMIN — SODIUM CHLORIDE: 9 INJECTION, SOLUTION INTRAVENOUS at 23:01

## 2020-11-11 ASSESSMENT — PAIN DESCRIPTION - PAIN TYPE
TYPE: ACUTE PAIN;SURGICAL PAIN

## 2020-11-11 ASSESSMENT — PAIN SCALES - GENERAL: PAIN_LEVEL: 6

## 2020-11-11 ASSESSMENT — PAIN SCALES - WONG BAKER: WONGBAKER_NUMERICALRESPONSE: HURTS A LITTLE MORE

## 2020-11-11 NOTE — ED PROVIDER NOTES
"ED Provider Note    CHIEF COMPLAINT   Chief Complaint   Patient presents with   • Post-Op Pain     pt had total knee replacement on the 5th of this month, pt c/o pain, \"never let up\"        HPI   Mychal Cano is a 70 y.o. male who presents complaining of right knee pain after having a total knee replacement on the fifth of this month.  He is already taking oxycodone, tramadol and an over-the-counter anti-inflammatory and nothing is helping the pain is not touching it.  He denies any redness or drainage from his knee.  He denies any fevers or chills.  He has not had any falls or trauma.  The patient called the orthopedist office and was told to come to the emergency department for further care.  Patient states that he was supposed to go to physical therapy today today but could not because the pain was too severe.    REVIEW OF SYSTEMS   See HPI for further details.  positive postoperative knee pain no chest pain no shortness of breath.    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       FAMILY HISTORY  History reviewed. No pertinent family history.    SOCIAL HISTORY  Social History     Socioeconomic History   • Marital status:      Spouse name: Not on file   • Number of children: Not on file   • Years of education: Not on file   • Highest education level: Not on file   Occupational History   • Not on file   Social Needs   • Financial resource strain: Not on file   • Food insecurity     Worry: Not on file     Inability: Not on file   • Transportation needs     Medical: Not on file     Non-medical: Not on file   Tobacco Use   • Smoking status: Former Smoker     Types: Cigarettes     Quit date:      Years since quittin.8   • Smokeless tobacco: Never Used   • Tobacco comment: smoked " "a couple cigs while in grad school.   Substance and Sexual Activity   • Alcohol use: Yes     Frequency: 4 or more times a week     Drinks per session: 1 or 2     Comment: 2 drinks per day   • Drug use: Not Currently     Comment: Last had marijuana 2016. Tried other drugs in the 1960's.   • Sexual activity: Not on file   Lifestyle   • Physical activity     Days per week: Not on file     Minutes per session: Not on file   • Stress: Not on file   Relationships   • Social connections     Talks on phone: Not on file     Gets together: Not on file     Attends [employer] service: Not on file     Active member of club or organization: Not on file     Attends meetings of clubs or organizations: Not on file     Relationship status: Not on file   • Intimate partner violence     Fear of current or ex partner: Not on file     Emotionally abused: Not on file     Physically abused: Not on file     Forced sexual activity: Not on file   Other Topics Concern   • Not on file   Social History Narrative   • Not on file       SURGICAL HISTORY  Past Surgical History:   Procedure Laterality Date   • PB TOTAL KNEE ARTHROPLASTY Right 11/4/2020    Procedure: ARTHROPLASTY, KNEE, TOTAL;  Surgeon: Bernardo Olguin M.D.;  Location: SURGERY [facility];  Service: Orthopedics   • COLONOSCOPY  2018   • FOOT SURGERY Left 2010    heel with 2 screws   • OTHER  1965    nose realigned-sedated with cocaine. No surgical cut.   • APPENDECTOMY  1958       CURRENT MEDICATIONS   Home Medications    **Home medications have not yet been reviewed for this encounter**         ALLERGIES   Allergies   Allergen Reactions   • Latex Hives and Rash     Blisters.   • Tape Rash     States had a reaction to tape & Coban wrap       PHYSICAL EXAM  VITAL SIGNS: BP (!) 114/36   Pulse 72   Temp 36.8 °C (98.2 °F) (Temporal)   Resp 16   Ht 1.753 m (5' 9\")   Wt 69.3 kg (152 lb 12.5 oz)   SpO2 98%   BMI 22.56 kg/m²       Constitutional: Well developed, Well nourished, No " acute distress, Non-toxic appearance.   Cardiovascular: Normal heart rate, Normal rhythm, No murmurs, No rubs, No gallops.   Thorax & Lungs: Normal breath sounds, No respiratory distress, No wheezing, No chest tenderness.   Abdomen: Bowel sounds normal, Soft, No tenderness, No masses, No pulsatile masses.   Skin: Warm, Dry, No erythema, No rash.   Extremities: Intact distal pulses, No cyanosis, No clubbing.   Musculoskeletal: Patient has a clean dry incision over the anterior surface of his right knee that has no drainage.  His right knee is swollen but not erythematous or warm.  Is diffusely tender and he is not ranging it secondary to the pain.  Neurologic: Alert & oriented x 3, Normal motor function, Normal sensory function, No focal deficits noted.     RADIOLOGY/PROCEDURES  DX-KNEE 2- RIGHT   Final Result      There are new radiopaque debris/fragment seen in the suprapatellar recess which is not seen on post op image. Unclear donor site.            COURSE & MEDICAL DECISION MAKING  Pertinent Labs & Imaging studies reviewed. (See chart for details)  The patient has postoperative pain in his knee without any evidence of infection.  He is postop day 6 from his surgery.  I explained to the patient that he is already on more pain medication than I would be able to prescribe for him.  I also explained to him that through the ER we are only allowed to prescribe 3 days worth of pain medications and that he was already taking everything that we would have prescribed.  I reassured him that there was no evidence of infection.  I have placed a call into orthopedics to get him on a pain management plan of some sort.      1:13 PM spoke with Dr. Olguin who asked me to order an x-ray of the patient's right knee.  He states that he will come and talk to the patient in about an hour when his orthopedic cases.  The patient received some IM morphine here from me for pain control and is resting comfortably in the chair.    2:15 PM  x-ray shows a new radiopaque fragment in the suprapatellar recess.  We are awaiting Dr. Olguin to make final disposition.    2:24 PM Dr. Olguin called me and wants to admit the patient to the hospital for pain control and possibly a catheter placement for anesthesia for pain control.  I will write holding orders for his admission.      FINAL IMPRESSION  1.  Post Operative right knee pain  2. Bony fragment in suprapatellar recess      Electronically signed by: Erin Bailey M.D., 11/11/2020 12:02 PM

## 2020-11-11 NOTE — PROGRESS NOTES
Patient known to me s/p Right TKA, has had issues with pain control  No fracture  No dislocation  No evidence of infection, blood clot or compartment syndrome  On exam,  Afebrile  No tachycardia or hypertension  Respirations unlabored  Sats in high 90's on room air  Sleeping, arousable  Alert, oriented  Right knee with Valeria in place, typical amount of swelling 1 week s/p TKA  Calf soft, compressible  Moving extremities spontaneously  Will request Acute Pain Consult for adductor block  Patient requesting admission  Will admit for overnight observation, pain control  May need SNF consult if pain can't be controlled

## 2020-11-11 NOTE — ED TRIAGE NOTES
"Chief Complaint   Patient presents with   • Post-Op Pain     pt had total knee replacement on the 5th of this month, pt c/o pain, \"never let up\"      BP (!) 114/36   Pulse 72   Temp 36.8 °C (98.2 °F) (Temporal)   Resp 16   Ht 1.753 m (5' 9\")   Wt 69.3 kg (152 lb 12.5 oz)   BMI 22.56 kg/m²     Pt arrived w/ above concern, last took pain meds about an hour PTA, oxycodone 10 and tramadol.    Pt was at [facility] ED yesterday for pain.    COVID screen negative, mask in place.   "

## 2020-11-12 ENCOUNTER — NURSE TRIAGE (OUTPATIENT)
Dept: HEALTH INFORMATION MANAGEMENT | Facility: OTHER | Age: 70
End: 2020-11-12

## 2020-11-12 VITALS
TEMPERATURE: 98.1 F | BODY MASS INDEX: 22.63 KG/M2 | OXYGEN SATURATION: 95 % | WEIGHT: 152.78 LBS | RESPIRATION RATE: 18 BRPM | HEIGHT: 69 IN | HEART RATE: 63 BPM | DIASTOLIC BLOOD PRESSURE: 58 MMHG | SYSTOLIC BLOOD PRESSURE: 102 MMHG

## 2020-11-12 LAB
HCT VFR BLD AUTO: 35.8 % (ref 42–52)
HGB BLD-MCNC: 11.9 G/DL (ref 14–18)

## 2020-11-12 PROCEDURE — 36415 COLL VENOUS BLD VENIPUNCTURE: CPT

## 2020-11-12 PROCEDURE — 85018 HEMOGLOBIN: CPT

## 2020-11-12 PROCEDURE — 700111 HCHG RX REV CODE 636 W/ 250 OVERRIDE (IP): Performed by: PHYSICIAN ASSISTANT

## 2020-11-12 PROCEDURE — 85014 HEMATOCRIT: CPT

## 2020-11-12 PROCEDURE — 700102 HCHG RX REV CODE 250 W/ 637 OVERRIDE(OP): Performed by: PHYSICIAN ASSISTANT

## 2020-11-12 PROCEDURE — A9270 NON-COVERED ITEM OR SERVICE: HCPCS | Performed by: PHYSICIAN ASSISTANT

## 2020-11-12 RX ORDER — TRAMADOL HYDROCHLORIDE 50 MG/1
50 TABLET ORAL EVERY 6 HOURS PRN
Qty: 40 TAB | Refills: 0 | Status: SHIPPED | OUTPATIENT
Start: 2020-11-12 | End: 2020-11-22

## 2020-11-12 RX ORDER — OXYCODONE HCL 10 MG/1
10 TABLET, FILM COATED, EXTENDED RELEASE ORAL EVERY 12 HOURS
Qty: 20 TAB | Refills: 0 | Status: SHIPPED | OUTPATIENT
Start: 2020-11-12 | End: 2020-11-22

## 2020-11-12 RX ORDER — PSEUDOEPHEDRINE HCL 30 MG
100 TABLET ORAL 2 TIMES DAILY
Qty: 60 CAP | Refills: 0
Start: 2020-11-12

## 2020-11-12 RX ORDER — OXYCODONE HYDROCHLORIDE 10 MG/1
10 TABLET ORAL EVERY 6 HOURS PRN
Qty: 40 TAB | Refills: 0 | Status: SHIPPED | OUTPATIENT
Start: 2020-11-12 | End: 2020-11-22

## 2020-11-12 RX ADMIN — KETOROLAC TROMETHAMINE 15 MG: 30 INJECTION, SOLUTION INTRAMUSCULAR; INTRAVENOUS at 08:05

## 2020-11-12 RX ADMIN — KETOROLAC TROMETHAMINE 15 MG: 30 INJECTION, SOLUTION INTRAMUSCULAR; INTRAVENOUS at 01:34

## 2020-11-12 RX ADMIN — OXYCODONE HYDROCHLORIDE 10 MG: 10 TABLET, FILM COATED, EXTENDED RELEASE ORAL at 08:04

## 2020-11-12 RX ADMIN — TAMSULOSIN HYDROCHLORIDE 0.4 MG: 0.4 CAPSULE ORAL at 05:07

## 2020-11-12 RX ADMIN — COLCHICINE 0.6 MG: 0.6 TABLET, FILM COATED ORAL at 05:07

## 2020-11-12 RX ADMIN — ASPIRIN 81 MG: 81 TABLET, COATED ORAL at 05:07

## 2020-11-12 RX ADMIN — ACETAMINOPHEN 1000 MG: 500 TABLET, FILM COATED ORAL at 01:34

## 2020-11-12 RX ADMIN — ACETAMINOPHEN 1000 MG: 500 TABLET, FILM COATED ORAL at 08:04

## 2020-11-12 RX ADMIN — GABAPENTIN 300 MG: 300 CAPSULE ORAL at 05:07

## 2020-11-12 ASSESSMENT — PAIN DESCRIPTION - PAIN TYPE: TYPE: ACUTE PAIN;SURGICAL PAIN

## 2020-11-12 NOTE — ANESTHESIA PROCEDURE NOTES
Peripheral Block    Date/Time: 11/11/2020 6:26 PM  Performed by: Marvin Wood M.D.  Authorized by: Marvin Wood M.D.     Start Time:  11/11/2020 6:26 PM  Reason for Block: at surgeon's request and post-op pain management    patient identified, IV checked, site marked, risks and benefits discussed, surgical consent, monitors and equipment checked, pre-op evaluation and timeout performed    Patient Position:  Supine  Prep: Betadine    Monitoring:  Heart rate, continuous pulse ox and cardiac monitor  Block Region:  Lower Extremity  Lower Extremity - Block Type:  Selective FEMORAL nerve block at the Adductor Canal    Laterality:  Right  Procedures: ultrasound guided  Image captured, interpreted and electronically stored.  Local Infiltration:  Lidocaine  Strength:  1 %  Dose:  3 ml  Block Type:  Catheter  Needle Length:  100mm  Needle Gauge:  21 G  Needle Localization:  Ultrasound guidance  Injection Assessment:  Negative aspiration for heme, no paresthesia on injection, incremental injection and local visualized surrounding nerve on ultrasound  Evidence of intravascular injection: No     US Guided Selective Femoral Nerve Block at Adductor Canal:   US probe placed at mid-thigh level on externally rotated leg and femur identified.  Probe directed medially until Sartorius Muscle (SM), Femoral Artery (FA) and Saphenous Nerve (SN) identified in Adductor Canal (AC).  Needle inserted anterolateral to probe in an in plane approach into a subsartorial perivascular perineural position.  After negative aspiration LA injected with ease and visualized spreading within the AC. Catheter inserted and positioned confirmed by ultrasound.

## 2020-11-12 NOTE — DISCHARGE PLANNING
Anticipated Discharge Disposition: Home     Action: Pt has d/c order and order for DME (Walker). LSW met with pt at bedside to discuss DME CHOICE. Pt states to have a walker available at home.     LSW provided copy of IMM. LSW placed verbal consent IMM in pt's chart.     Pt's spouse available for transportation upon d/c.     Barriers to Discharge: None     Plan: LSW to assist as needed         Care Transition Team Assessment  The information gathered for this assessment was provided by pt and chart review. Pt confirmed the information on facesheet. Pt's PCP is Adriano Wayne. Pt independent with ADL's/IADL's prior to admit. Pt's insurance covers medications. Pt's pharmacy [facility] off of [address] in [place].       Information Source  Orientation : Oriented x 4  Information Given By: Patient  Who is responsible for making decisions for patient? : Patient    Elopement Risk  Legal Hold: No  Ambulatory or Self Mobile in Wheelchair: No-Not an Elopement Risk    Discharge Preparedness  What is your plan after discharge?: Home with help  What are your discharge supports?: Spouse  Prior Functional Level: Independent with Activities of Daily Living, Independent with Medication Management  Difficulity with ADLs: None  Difficulity with IADLs: None    Functional Assesment  Prior Functional Level: Independent with Activities of Daily Living, Independent with Medication Management    Finances  Financial Barriers to Discharge: No  Prescription Coverage: Yes    Vision / Hearing Impairment  Right Eye Vision: Impaired, Wears Glasses  Left Eye Vision: Impaired, Wears Glasses    Domestic Abuse  Have you ever been the victim of abuse or violence?: No    Discharge Risks or Barriers  Discharge risks or barriers?: No  Patient risk factors: Vulnerable adult    Anticipated Discharge Information  Discharge Disposition: Discharged to home/self care (01)

## 2020-11-12 NOTE — ED NOTES
Pt requesting po fluids; pt instr to maintain NPO status at this time, as per Dr Olguin/and ERP ord. Pt verbalized understanding.

## 2020-11-12 NOTE — ANESTHESIA POSTPROCEDURE EVALUATION
Patient: Mychal Cano    Procedure Summary     Date: 11/11/20 Room / Location:     Anesthesia Start: 1823 Anesthesia Stop: 1840    Procedure: Pain Service Consult Diagnosis:     Scheduled Providers:  Responsible Provider:     Anesthesia Type: peripheral nerve block ASA Status: 2          Final Anesthesia Type: peripheral nerve block  Last vitals  BP   Blood Pressure : 116/67    Temp   36.4 °C (97.5 °F)    Pulse   Pulse: (!) 101   Resp   18    SpO2   98 %      Anesthesia Post Evaluation    Patient location during evaluation: bedside  Patient participation: complete - patient participated  Level of consciousness: awake and alert  Pain score: 6    Airway patency: patent  Anesthetic complications: no  Cardiovascular status: hemodynamically stable  Respiratory status: acceptable and room air  Hydration status: euvolemic    PONV: none           Nurse Pain Score: 6 (NPRS)

## 2020-11-12 NOTE — PROGRESS NOTES
Patient's wife requesting a restarting of his Colchicine. Will restart the colchicine per her request. Explained R/B/A of this medication.  I discussed the need to move after a TKA to avoid stiffness and that the patient may need time in a SNF if his pain can not be controlled with oral medications.

## 2020-11-12 NOTE — DISCHARGE INSTRUCTIONS
Dr. Olguin's Discharge Instructions:  The first week after your joint replacement is a time to recover from the surgery. We expect light exercise to keep you active and mobile. Dr. Olguin requires a walker or cane for most patients in the first few days following surgery to try to prevent falls or complications.     Most patients are prescribed two medications for pain control: a narcotic such as Oxycodone or Hydrocodone and a milder medication called Tramadol. These can be alternated for pain control, and the priority is to decrease use of the stronger narcotic as soon as tolerated.   Take your pain medication as appropriate to ensure that your pain is not limiting your recovery. You'll be seen in clinic in 7-10 days (this appointment has already been made, call our office if you're unsure of the time). At that time, we'll prescribe your physical therapy to help with the recovery phase.     -Keep dressing clean and dry. Leave dressing in place until follow up. If you have an incisional vac dressing, the battery may die before your first post operative appointment, but you can leave the surgical dressing in place and it will be removed at your clinic visit. The battery of the dressing may die, and the sponge will inflate because it is no longer holding suction. You can still leave the dressing in place and it will be removed at the office. Call the office if you notice drainage from the surgical dressing.    -OK to shower, keep dressing in place. Pat dressing dry, do not rub incision    -Do not soak incision in bath, hot tub or pool    -Follow up with Dr. Olguin at regularly scheduled time    -Call [facility] office at [phone] for questions    -Weight bearing status: as tolerated with walker/cane    -Take medication as prescribed for DVT prophylaxis        Discharge Instructions    Discharged to home by car with relative. Discharged via wheelchair, hospital escort: Yes.  Special equipment needed: Not Applicable    Be  sure to schedule a follow-up appointment with your primary care doctor or any specialists as instructed.     Discharge Plan:        I understand that a diet low in cholesterol, fat, and sodium is recommended for good health. Unless I have been given specific instructions below for another diet, I accept this instruction as my diet prescription.   Other diet: Regular diet    Special Instructions: Discharge instructions for the Orthopedic Patient    Follow up with Primary Care Physician within 2 weeks of discharge to home, regarding:  Review of medications and diagnostic testing.  Surveillance for medical complications.  Workup and treatment of osteoporosis, if appropriate.     -Is this a Hip/Knee/Shoulder Joint Replacement patient? Yes   TOTAL KNEE REPLACEMENT, AFTER-CARE GUIDELINES     These instructions provide you with information on caring for yourself and your knee after surgery. Your health care provider may also give you instructions that are more specific. Your treatment was planned and performed according to current medical practices but problems sometimes occur. Call your health care provider if you have any problems or questions.     WHAT TO EXPECT AFTER THE PROCEDURE   After your procedure, your knee will typically be stiff, sore, and bruised. This will improve over time.     Pain   · Follow your home pain management plan as discussed with your nurse and as directed by your provider.   · It is important to follow any scheduled pain medications for maximal pain relief.   · If prescribed opioid medication, the goal is to use opioids only as needed and to wean off prescription pain medicine as soon as possible.   · Ice can be used for pain control.   · Put ice in a plastic bag.   · Place a towel between your skin and the bag.   · Leave the ice on for 20 minutes, 2-3 times a day at a minimum.   · Most patients are off the pain pills by 3 weeks. If your pain continues to be severe, follow up with your  provider.     Infection   Knee joint infections occur in fewer than 2% of patients. The most common causes of infection following total knee replacement surgery are from bacteria that enter the bloodstream during dental procedures, urinary tract infections, or skin infections. These bacteria can lodge around your knee replacement and cause an infection.   · Keep the incision as clean and dry as possible.   · Always wash your hands before touching your incision.   · Avoid dental care for 3 months after surgery. Your provider may recommend taking a dose of antibiotics an hour prior to any dental procedure. After 2 years, most providers recommend antibiotics only before an extensive procedure. Ask your provider what they recommend.   · Signs and symptoms of infection include low-grade fever, redness, pain, swelling and drainage from your incision. Notify your provider IMMEDIATELY if you develop ANY of these symptoms.     Post op Disturbances   · Bowel Habits - Constipation is extremely common and caused by a combination of anesthesia, lack of mobility, dehydration and pain medicine. Use stool softeners or laxatives if necessary. It is important not to ignore this problem as bowel obstructions can be a serious complication after joint replacement surgery.   · Mood/Energy Level - Many patients experience a lack of energy and endurance for up to 2-3 months after surgery. Some people feel down and can even become depressed. This is likely due to postoperative anemia, change in activity level, lack of sleep, pain medicine and just the emotional reaction to the surgery itself that is a big disruption in a person’s life. This usually passes. If symptoms persist, follow up with your primary care provider.  · Returning to Work - Your provider will give you specific instructions based on your profession. Generally, if you work a sedentary job requiring little standing or walking, most patients may return within 2-6 weeks. Manual  labor jobs involving walking, lifting and standing may take 3-4 months. Your provider’s office can provide a release to part-time or light duty work early on in your recovery and progress you to full duty as able.   · Driving - You can begin driving once cleared by your provider, provided you are no longer taking narcotic pain medication or any other medications that impair driving. Discuss the length of time expected with your provider as returning to driving depends on things such as your vehicle, which knee was replaced (right or left), and knee motion, strength and reflexes returning appropriately.   · Avoiding falls - A fall during the first few weeks after surgery can damage your new knee and may result in a need for further surgery.  throw rugs and tack down loose carpeting. Be aware of floor hazards such as pets, small objects or uneven surfaces. Notify your provider of any falls.   · [employer] Metal Detectors - The sensitivity of metal detectors varies and it is likely that your prosthesis will cause an alarm. Inform the  of your artificial joint.     Diet   · Resume your normal diet as tolerated.   · It is important to achieve a healthy nutritional status by eating a well-balanced diet on a regular basis.   · Your provider may recommend that you take iron and vitamin supplements.   · Continue to drink plenty of fluids.     Shower/Bathing   · You may shower as soon as you get home from the hospital unless otherwise instructed.   · Keep your incision out of water to prevent infection. To keep the incision dry when showering, cover it with a plastic bag or plastic wrap. If your bandage is waterproof, this may not be necessary. o Pat incision dry if it gets wet. Do not rub. Notify your provider.   · Do not submerge in a bath until cleared by your provider. Your staples must be out and the incision completely healed.     Dressing Change: Only change your dressing if directed by your  provider.   · Wash hands.   · Open all dressing change materials.   · Remove old dressing and discard.   · Inspect incision for signs of irritation or infection including redness, increase in clear drainage, yellow/green drainage, odor and surrounding skin hot to touch. Notify your provider if present.   ·  the new dressing by one corner and lay over the incision. Be careful not to touch the inside of the dressing that will lay over the incision.   · Secure in place as instructed. Swelling/Bruising   · Swelling is normal after knee replacement and can involve the thigh, knee, calf and foot.   · Swelling can last from 3-6 months.   · To reduce swelling, elevate your leg higher than your heart while reclining. The first week you are home you should elevate your leg an equal amount of time as you are active.   · The swelling is usually worse after you go home since you are upright for longer periods of time.   · Bruising often does not appear until after you arrive home and can be quite dramatic- appearing purple, black, or green. Bruising is typically not concerning and will subside without any treatment.     Blood Clot Prevention   Your treatment plan includes multiple preventative measures to decrease the risk of blood clots in the legs (DVTs) and the less common, but serious, clots that travel to the lungs (pulmonary emboli). Most patients are at standard risk for them, but people who are at higher risk include those who have had previous clots, a family history of clotting, smoking, diabetes, obesity, advanced age, use estrogen and/or live a sedentary lifestyle.     · Signs of blood clots in legs include - Swelling in thigh, calf or ankle that does not go down with elevation. Pain, heat and tenderness in calf, back of calf or groin area. NOTE: blood clots can occur in either leg.   · Signs of blood clots in lungs include - Sudden increased shortness of breath, sudden onset of chest pain, and localized chest  pain with coughing.   · If you experience any of the above symptoms, notify your provider and seek medical attention immediately.   · You received anticoagulant therapy (blood thinners) in the hospital. Continue the prescribed blood-thinning medication at home, as directed by your provider.   · Your risk for developing a clot continues for up to 2-3 months after surgery. Avoid prolonged sitting and dehydration (long air trips and car trips). If you do take a trip during this time, please get up, move around every 1-1.5 hours, and discuss all travel plans with your provider.     Activity   Once home, stay active. The key is not to overdo it. While you can expect some good days and some bad days, you should notice a gradual improvement and a gradual increase in your endurance over the next 6 to 12 months. Exercise is a critical component of recovery, particularly during the first few weeks after surgery.     · Normal activities of daily living - Expect to resume most within 3 to 6 weeks following surgery. Some pain with activity and at night is common for several weeks after surgery. Walk as much as you like once your doctor gives permission to proceed, but remember that walking is no substitute for the exercises your doctor and physical therapist prescribe. Use a walker, crutches or cane to assist with walking until you can walk smoothly (minimal or no limp) without assistance.   · Physical Therapy Exercises - Follow your home exercise program as instructed by your physical therapist during your hospital stay. Call and set up outpatient physical therapy appointments per your provider’s recommendations. Physical therapy after the hospital stay focuses on increasing your range of motion, strengthening your muscles and improving your gait/walking pattern. Contact your provider for the referral to outpatient physical therapy if you have not yet received this. -   · Riding a stationary bicycle can help maintain muscle  tone and keep your knee flexible. Begin stationary bicycling as directed by your physical therapist or provider.   · Sexual Activity - Your provider can tell you when it safe to resume sexual activity.   · Sleeping Positions - You can safely sleep on your back, on either side, or on your stomach.   · Other Activities - Lower impact activities are preferred. Consult your provider if you have specific questions.     When to Call the Doctor   Call the provider if you experience:   · Fever over 100.5° F   · Increased pain, drainage, redness, odor or heat around the incision area   · Shaking chills   · Increased knee pain with activity and rest   · Increased pain in calf, tenderness or redness above or below the knee   · Increased swelling of calf, ankle, foot   · Sudden increased shortness of breath, sudden onset of chest pain, localized chest pain with coughing   · Incision opening   Or, if there are any questions or concerns about medications or care.     Infection statistic resource:   https://www.[employer]/contents/prosthetic-joint-infection-epidemiology-microbiology-clinical-manifestations-and-diagnosis     -Is this patient being discharged with medication to prevent blood clots?  No    · Is patient discharged on Warfarin / Coumadin?   No     Depression / Suicide Risk    As you are discharged from this [facility] facility, it is important to learn how to keep safe from harming yourself.    Recognize the warning signs:  · Abrupt changes in personality, positive or negative- including increase in energy   · Giving away possessions  · Change in eating patterns- significant weight changes-  positive or negative  · Change in sleeping patterns- unable to sleep or sleeping all the time   · Unwillingness or inability to communicate  · Depression  · Unusual sadness, discouragement and loneliness  · Talk of wanting to die  · Neglect of personal appearance   · Rebelliousness- reckless behavior  · Withdrawal from  people/activities they love  · Confusion- inability to concentrate     If you or a loved one observes any of these behaviors or has concerns about self-harm, here's what you can do:  · Talk about it- your feelings and reasons for harming yourself  · Remove any means that you might use to hurt yourself (examples: pills, rope, extension cords, firearm)  · Get professional help from the community (Mental Health, Substance Abuse, psychological counseling)  · Do not be alone:Call your Safe Contact- someone whom you trust who will be there for you.  · Call your local CRISIS HOTLINE 893-5762 or 957-627-8523  · Call your local Children's Mobile Crisis Response Team [place] (260) 782-4451 or www.[employer]  · Call the toll free National Suicide Prevention Hotlines   · National Suicide Prevention Lifeline 244-068-PKSF (7200)  · National Hope Line Network 800-SUICIDE (306-5069)

## 2020-11-12 NOTE — PROGRESS NOTES
"S:  s/p right TKA on 11-4-20, readmitted yesterday for pain control issues  Started on Oxycontin 10 mg PO bid and acute pain consult led to On-Q pump placement.  Pain controlled per patient, he feels \"100% better\"  No N/V  No Chest Pain/SOB  Afebrile  Exam:    NAD A& O x3    RLE: +DF/PF/EHL SILT L4/L5/S1  LLE:  +DF/PF/EHL SILT L4/L5/S1  No calf pain  Compartments soft  Plan:  Continue standard plan of care  Weight bearing: as tolerated with walker/cane  DVT prophylaxis: SCD/Teds + ASA  Dispo: home today per patient request  I discussed the use of Oxycontin on a scheduled basis x 10 days  Also discussed the risks of arthrofibrosis and the importance of physical therapy to regain range of motion  "

## 2020-11-12 NOTE — ANESTHESIA QCDR
2019 [facility] Clinical Data Registry (for Quality Improvement)     Postoperative nausea/vomiting risk protocol (Adult = 18 yrs and Pediatric 3-17 yrs)- (430 and 463)  General inhalation anesthetic (NOT TIVA) with PONV risk factors: No  Provision of anti-emetic therapy with at least 2 different classes of agents: N/A  Patient DID NOT receive anti-emetic therapy and reason is documented in Medical Record: N/A    Multimodal Pain Management- (477)  Non-emergent surgery AND patient age >= 18: No  Use of Multimodal Pain Management, two or more drugs and/or interventions, NOT including systemic opioids:   Exception: Documented allergy to multiple classes of analgesics:     Smoking Abstinence (404)  Patient is current smoker (cigarette, pipe, e-cig, marijuanna): No  Elective Surgery:   Abstinence instructions provided prior to day of surgery:   Patient abstained from smoking on day of surgery:     Pre-Op Beta-Blocker in Isolated CABG (44)  Isolated CABG AND patient age >= 18: No  Beta-blocker admin within 24 hours of surgical incision:   Exception:of medical reason(s) for not administering beta blocker within 24 hours prior to surgical incision (e.g., not  indicated,other medical reason):     PACU assessment of acute postoperative pain prior to Anesthesia Care End- Applies to Patients Age = 18- (ABG7)  Initial PACU pain score is which of the following: < 7/10  Patient unable to report pain score: N/A    Post-anesthetic transfer of care checklist/protocol to PACU/ICU- (426 and 427)  Upon conclusion of case, patient transferred to which of the following locations:   Use of transfer checklist/protocol:   Exclusion: Service Performed in Patient Hospital Room (and thus did not require transfer):   Unplanned admission to ICU related to anesthesia service up through end of PACU care- (MD51)  Unplanned admission to ICU (not initially anticipated at anesthesia start time): No

## 2020-11-12 NOTE — PROGRESS NOTES
Report from BERNICE Swann. Pt is a&ox4. Complains of R knee pain 7/10 at this time, given scheduled pain meds per MAR. Pt on RA, denies any SOB/chest pain at this time. On Q pump observed, rate verified with day shift RN. R knee surgical dressing observed, CDI. + CMS, denies numbness/tingling at this time, able to dorsi/planter flex BLE. Pt up SB assist with FWW to bathroom, had loose BM, stool softeners held this evening.    2100 Pt reports pain is tolerable, he reports R knee pain at 6 on a scale from 1 to 10 at this time with oral pain meds and scheduled IV toradol per MAR.

## 2020-11-12 NOTE — ANESTHESIA PREPROCEDURE EVALUATION
Nans H&P:  PAST MEDICAL HISTORY:   70 y.o. male who presents for .  He has current and past medical problems significant for:    S/P Right total knee replacement with Dr Gan 2020  Presented to ED with worsening pain.  Consulted for Adductor catheter placement.    Patient denies history of seizures or strokes  Patient denies history of diabetes or thyroid disease  Patient denies history of GERD or esophageal disease  Patient denies history of SHARMILA  Patient denies history of previous MI, chest pain or shortness of breath  Patient denies history of renal failure  Patient denies history of upper or lower extremity motor/sensory deficits   Patient denies history of bleeding disorders  Patient denies history of complications with anesthesia    Able to climb 2 flights of stair without dyspnea or angina, > 4 METs     Past Medical History:   Diagnosis Date   • Arthritis     RA to lower extremities   • Gout    • Heart burn     tums prn   • Knee pain, bilateral 10/22/2020   • Urinary bladder disorder     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       SMOKING/ALCOHOL/RECREATIONAL DRUG USE:  Social History     Tobacco Use   • Smoking status: Former Smoker     Types: Cigarettes     Quit date:      Years since quittin.8   • Smokeless tobacco: Never Used   • Tobacco comment: smoked a couple cigs while in grad school.   Substance Use Topics   • Alcohol use: Yes     Frequency: 4 or more times a week     Drinks per session: 1 or 2     Comment: 2 drinks per day   • Drug use: Not Currently     Comment: Last had marijuana . Tried other drugs in the 's.     Social History     Substance and Sexual Activity   Drug Use Not Currently    Comment: Last had marijuana . Tried other drugs in the 's.       PAST SURGICAL HISTORY:  Past Surgical History:   Procedure Laterality Date   • PB TOTAL KNEE ARTHROPLASTY Right 2020    Procedure: ARTHROPLASTY, KNEE, TOTAL;  Surgeon: Bernardo Olguin M.D.;  Location:  SURGERY [facility];  Service: Orthopedics   • COLONOSCOPY  2018   • FOOT SURGERY Left 2010    heel with 2 screws   • OTHER  1965    nose realigned-sedated with cocaine. No surgical cut.   • APPENDECTOMY  1958       ALLERGIES:   Allergies   Allergen Reactions   • Latex Hives and Rash     Blisters.   • Tape Rash     States had a reaction to tape & Coban wrap       MEDICATIONS:  No current facility-administered medications on file prior to encounter.      Current Outpatient Medications on File Prior to Encounter   Medication Sig Dispense Refill   • acetaminophen (TYLENOL) 500 MG Tab Take 1 Tab by mouth every 6 hours. 30 Tab 0   • aspirin EC 81 MG EC tablet Take 1 Tab by mouth 2 Times a Day. 30 Tab 0   • ondansetron (ZOFRAN ODT) 4 MG TABLET DISPERSIBLE Take 1 Tab by mouth every four hours as needed for Nausea. 10 Tab 0   • oxyCODONE immediate-release (ROXICODONE) 5 MG Tab Take 1 Tab by mouth every 6 hours as needed for Severe Pain for up to 10 days. 40 Tab 0   • traMADol (ULTRAM) 50 MG Tab Take 1 Tab by mouth every 6 hours as needed (Moderate Pain (NRS Pain Scale 4-6; CPOT Pain Scale 3-5) if opiates not ordered or tolerated) for up to 10 days. 40 Tab 0   • COLCRYS 0.6 MG Tab Take 0.5 mg by mouth as needed. Twice a week prn     • Diclofenac Sodium 1 % Gel Apply  to affected area(s) 1 time daily as needed.         LABS:  Lab Results   Component Value Date/Time    HEMOGLOBIN 12.5 (L) 11/11/2020 1511    HEMATOCRIT 37.2 (L) 11/11/2020 1511    WBC 11.2 (H) 11/11/2020 1511     Lab Results   Component Value Date/Time    SODIUM 136 11/11/2020 1646    POTASSIUM 4.7 11/11/2020 1646    CHLORIDE 96 11/11/2020 1646    CO2 25 11/11/2020 1646    GLUCOSE 86 11/11/2020 1646    BUN 22 11/11/2020 1646    CALCIUM 9.1 11/11/2020 1646       SARS-CoV2 result: Negative      PREVIOUS ANESTHETICS: See EMR  __________________________________________      Relevant Problems   Other   (+) Arthritis       Physical Exam    Airway   Mallampati:  II  TM distance: >3 FB  Neck ROM: full       Cardiovascular - normal exam  Rhythm: regular  Rate: normal  (-) murmur     Dental - normal exam        Facial Hair   Pulmonary - normal exam  Breath sounds clear to auscultation     Abdominal    Neurological - normal exam                 Anesthesia Plan    ASA 2       Plan - peripheral nerve block     Peripheral nerve block will be post-op pain control        Induction: intravenous    Postoperative Plan: Postoperative administration of opioids is intended.    Pertinent diagnostic labs and testing reviewed    Informed Consent:    Anesthetic plan and risks discussed with patient.    Use of blood products discussed with: patient whom consented to blood products.

## 2020-11-12 NOTE — PROGRESS NOTES
Pt Mychal Cano admitted to room 204-1 via transport in wheelchair from ER at 1540.  Pt A&Ox4, r knee with waldo dressing intact, no drainage, +CMS, up to bathroom BM x1, anesthesia at bedside for on-q pump placement.

## 2020-11-12 NOTE — ANESTHESIA TIME REPORT
Anesthesia Start and Stop Event Times     Date Time Event    11/11/2020 1823 Anesthesia Start     1835 Ready for Procedure     1840 Anesthesia Stop        Responsible Staff    No responsible staff documented.       Preop Diagnosis (Free Text):  Pre-op Diagnosis             Preop Diagnosis (Codes):    Post op Diagnosis  Post-op pain      Premium Reason  A. 3PM - 7AM    Comments: Adductor canal catheter for post op pain

## 2020-11-12 NOTE — DISCHARGE SUMMARY
"DATE OF ADMISSION:  11/11/2020.    DATE OF DISCHARGE:  11/12/2020.    ADMISSION DIAGNOSIS:  Right knee pain.    DISCHARGE  DIAGNOSIS:  Right knee pain.    PROCEDURES PERFORMED ON THIS ADMISSION:  Placement of On-Q adductor pump.    INDICATIONS FOR ADMISSION:  This patient is a 70-year-old gentleman who had a   right knee replacement done 1 week ago.  He was discharged with his pain well   controlled.  However, when his anesthetic block wore off, he had pain and   discomfort that was unable to be controlled with oral pain medications.  He   was seen in the emergency department and an acute pain consult was called.  He   was admitted for overnight observation and On-Q pump was placed during his   hospital course, he did state that he felt \"100% better\" and was eager to   proceed with physical therapy.  It was stressed to him about the importance of   physical therapy.  He was also started on OxyContin 10 mg p.o. q. 12 hours on   a scheduled basis with oxycodone 10 mg tablets for breakthrough pain.  He was   having some issues with constipation, but did have bowel movements while in   the hospital.  On day of discharge, he was afebrile, ambulatory, tolerating   regular diet, was deemed to be suitable for discharge to home.  The importance   of physical therapy was explained to him.    WOUND CARE:  The patient will leave his surgical bandage in place until seen   by orthopedics.    MEDICATIONS:  Please see list.  Per patient and his family's request, he was   restarted on his colchicine as he does feel like he may have some gout in   other areas.    ACTIVITY:  The patient is weightbearing as tolerated with a walker or cane for   ambulation purposes.    FOLLOWUP:  The patient will follow up with Dr. Olguin in [facility] at his regularly scheduled time next week.       ____________________________________     MD JORDON Schmid / JESSICA    DD:  11/12/2020 06:30:50  DT:  11/12/2020 08:02:38    D#:  [id number]  " Job#:  [id number]
